# Patient Record
Sex: FEMALE | Race: WHITE | NOT HISPANIC OR LATINO | Employment: FULL TIME | URBAN - METROPOLITAN AREA
[De-identification: names, ages, dates, MRNs, and addresses within clinical notes are randomized per-mention and may not be internally consistent; named-entity substitution may affect disease eponyms.]

---

## 2017-02-25 DIAGNOSIS — E78.5 DYSLIPIDEMIA, GOAL LDL BELOW 130: ICD-10-CM

## 2017-02-25 RX ORDER — PRAVASTATIN SODIUM 20 MG
20 TABLET ORAL DAILY
Qty: 90 TAB | Refills: 2 | Status: SHIPPED | OUTPATIENT
Start: 2017-02-25 | End: 2017-12-07 | Stop reason: SDUPTHER

## 2017-03-09 RX ORDER — HYDROCHLOROTHIAZIDE 12.5 MG/1
TABLET ORAL
Qty: 90 TAB | Refills: 1 | Status: SHIPPED | OUTPATIENT
Start: 2017-03-09 | End: 2017-09-17 | Stop reason: SDUPTHER

## 2017-03-28 RX ORDER — OMEPRAZOLE 20 MG/1
CAPSULE, DELAYED RELEASE ORAL
Qty: 90 CAP | Refills: 0 | Status: SHIPPED | OUTPATIENT
Start: 2017-03-28 | End: 2017-07-11 | Stop reason: SDUPTHER

## 2017-06-19 ENCOUNTER — OFFICE VISIT (OUTPATIENT)
Dept: MEDICAL GROUP | Facility: CLINIC | Age: 57
End: 2017-06-19
Payer: OTHER MISCELLANEOUS

## 2017-06-19 VITALS
BODY MASS INDEX: 35.16 KG/M2 | WEIGHT: 211 LBS | HEART RATE: 66 BPM | TEMPERATURE: 98.2 F | RESPIRATION RATE: 16 BRPM | OXYGEN SATURATION: 93 % | SYSTOLIC BLOOD PRESSURE: 120 MMHG | HEIGHT: 65 IN | DIASTOLIC BLOOD PRESSURE: 70 MMHG

## 2017-06-19 DIAGNOSIS — Z23 NEED FOR TDAP VACCINATION: ICD-10-CM

## 2017-06-19 DIAGNOSIS — R73.01 ELEVATED FASTING GLUCOSE: ICD-10-CM

## 2017-06-19 DIAGNOSIS — Z00.00 ROUTINE GENERAL MEDICAL EXAMINATION AT A HEALTH CARE FACILITY: ICD-10-CM

## 2017-06-19 DIAGNOSIS — Z00.00 LABORATORY EXAMINATION ORDERED AS PART OF A ROUTINE GENERAL MEDICAL EXAMINATION: ICD-10-CM

## 2017-06-19 DIAGNOSIS — Z12.39 SCREENING BREAST EXAMINATION: ICD-10-CM

## 2017-06-19 DIAGNOSIS — E66.9 OBESITY, CLASS II, BMI 35-39.9: ICD-10-CM

## 2017-06-19 DIAGNOSIS — R92.30 DENSE BREAST TISSUE ON MAMMOGRAM: ICD-10-CM

## 2017-06-19 PROBLEM — E66.812 OBESITY, CLASS II, BMI 35-39.9: Status: ACTIVE | Noted: 2017-06-19

## 2017-06-19 PROCEDURE — 90715 TDAP VACCINE 7 YRS/> IM: CPT | Performed by: FAMILY MEDICINE

## 2017-06-19 PROCEDURE — 99396 PREV VISIT EST AGE 40-64: CPT | Mod: 25 | Performed by: FAMILY MEDICINE

## 2017-06-19 PROCEDURE — 90471 IMMUNIZATION ADMIN: CPT | Performed by: FAMILY MEDICINE

## 2017-06-19 ASSESSMENT — ENCOUNTER SYMPTOMS
ORTHOPNEA: 0
SENSORY CHANGE: 0
SHORTNESS OF BREATH: 0
HEADACHES: 0
SORE THROAT: 0
WEIGHT LOSS: 0
MYALGIAS: 0
VOMITING: 0
BLOOD IN STOOL: 0
NERVOUS/ANXIOUS: 0
PALPITATIONS: 0
FEVER: 0
CHILLS: 0
ABDOMINAL PAIN: 0
SPUTUM PRODUCTION: 0
DIARRHEA: 0
NAUSEA: 0
DEPRESSION: 0
HEARTBURN: 0
DIZZINESS: 0
BLURRED VISION: 0
SPEECH CHANGE: 0
DOUBLE VISION: 0
COUGH: 0

## 2017-06-19 ASSESSMENT — PATIENT HEALTH QUESTIONNAIRE - PHQ9: CLINICAL INTERPRETATION OF PHQ2 SCORE: 0

## 2017-06-19 NOTE — MR AVS SNAPSHOT
"        Crista Gomes Coad   2017 9:20 AM   Office Visit   MRN: [id number]    Department:  [facility]   Dept Phone:  [phone]    Description:  Female : 1960   Provider:  Cecilai Marquez M.D.           Reason for Visit     Annual Exam           Allergies as of 2017     Allergen Noted Reactions    Benazepril 2016       Cough      You were diagnosed with     Routine general medical examination at a health care facility   [V70.0.ICD-9-CM]       Laboratory examination ordered as part of a routine general medical examination   [V72.62.ICD-9-CM]       Screening breast examination   [562638]       Dense breast tissue on mammogram   [[id number]]       Elevated fasting glucose   [[id number]]       Need for Tdap vaccination   [[id number]]       Obesity, Class II, BMI 35-39.9 (CMS-HCC)   [852566]         Vital Signs     Blood Pressure Pulse Temperature Respirations Height Weight    120/70 mmHg 66 36.8 °C (98.2 °F) 16 1.651 m (5' 5\") 95.709 kg (211 lb)    Body Mass Index Oxygen Saturation Smoking Status             35.11 kg/m2 93% Former Smoker         Basic Information     Date Of Birth Sex Race Ethnicity Preferred Language    1960 Female White Non- English      Problem List              ICD-10-CM Priority Class Noted - Resolved    GERD (gastroesophageal reflux disease) K21.9   Unknown - Present    Edema of foot R60.0   Unknown - Present    Dyslipidemia, goal LDL below 130 E78.5   Unknown - Present    Vitamin D deficiency disease E55.9   Unknown - Present    Family history of diabetes mellitus Z83.3   Unknown - Present    Family history of premature coronary artery disease Z82.49   Unknown - Present    History of iron deficiency anemia Z86.2   2014 - Present    Erythema nodosum, acute form L52   2016 - Present    Dense breast tissue on mammogram R92.2   2017 - Present    Elevated fasting glucose R73.01   2017 - Present    Obesity, Class II, BMI 35-39.9 (CMS-HCC) E66.9   " 6/19/2017 - Present      Health Maintenance        Date Due Completion Dates    MAMMOGRAM 5/2/2015 5/2/2014 (Prv Comp), 10/25/2012 (Prv Comp), 5/31/2007, 5/31/2007    Override on 5/2/2014: Previously completed    Override on 10/25/2012: Previously completed    COLONOSCOPY 6/24/2026 6/24/2016    IMM DTaP/Tdap/Td Vaccine (2 - Td) 6/19/2027 6/19/2017            Current Immunizations     Tdap Vaccine 6/19/2017      Below and/or attached are the medications your provider expects you to take. Review all of your home medications and newly ordered medications with your provider and/or pharmacist. Follow medication instructions as directed by your provider and/or pharmacist. Please keep your medication list with you and share with your provider. Update the information when medications are discontinued, doses are changed, or new medications (including over-the-counter products) are added; and carry medication information at all times in the event of emergency situations     Allergies:  BENAZEPRIL - (reactions not documented)               Medications  Valid as of: June 19, 2017 -  1:00 PM    Generic Name Brand Name Tablet Size Instructions for use    Cholecalciferol (Cap) Vitamin D3 1000 UNITS Take 1 Cap by mouth every day.        HydroCHLOROthiazide (Tab) HYDRODIURIL 12.5 MG TAKE 1 TABLET DAILY        Irbesartan (Tab) AVAPRO 75 MG Take 1 Tab by mouth every day.        Iron Combinations (Tab) Iron  Take  by mouth.        Omeprazole (CAPSULE DELAYED RELEASE) PRILOSEC 20 MG TAKE 1 CAPSULE DAILY        Pravastatin Sodium (Tab) PRAVACHOL 20 MG Take 1 Tab by mouth every day.        .                 Medicines prescribed today were sent to:     [facility] [address] [place] [address] - [address] [address] [facility] [address]    Phone: [phone] Fax: [phone]    Open 24 Hours?: No    [facility] - [place], [place] - [address] [facility] [address]    [address] [facility] [address]    Phone:  [phone] Fax: [phone]    Open 24 Hours?: No      Medication refill instructions:       If your prescription bottle indicates you have medication refills left, it is not necessary to call your provider’s office. Please contact your pharmacy and they will refill your medication.    If your prescription bottle indicates you do not have any refills left, you may request refills at any time through one of the following ways: The online [employer] system (except Urgent Care), by calling your provider’s office, or by asking your pharmacy to contact your provider’s office with a refill request. Medication refills are processed only during regular business hours and may not be available until the next business day. Your provider may request additional information or to have a follow-up visit with you prior to refilling your medication.   *Please Note: Medication refills are assigned a new Rx number when refilled electronically. Your pharmacy may indicate that no refills were authorized even though a new prescription for the same medication is available at the pharmacy. Please request the medicine by name with the pharmacy before contacting your provider for a refill.        Your To Do List     Future Labs/Procedures Complete By Expires    CBC WITHOUT DIFFERENTIAL  As directed 12/20/2017    COMP METABOLIC PANEL  As directed 6/19/2018    HEMOGLOBIN A1C  As directed 6/20/2018    MA-MAMMO SCREENING BILAT W/GILDA W/CAD  As directed 6/19/2018         [employer] Access Code: Activation code not generated  Current [employer] Status: Active

## 2017-06-19 NOTE — PROGRESS NOTES
Subjective:      Crista Herring is a 57 y.o. female who presents with Annual Exam        She is seen today for her general medical examination.    Annual Exam  Pertinent negatives include no abdominal pain, chest pain, chills, congestion, coughing, fever, headaches, myalgias, nausea, rash, sore throat or vomiting.    has a past medical history of GERD (gastroesophageal reflux disease); Vitamin d deficiency; Edema of foot; Dyslipidemia, goal LDL below 130; Family history of diabetes mellitus; Family history of premature coronary artery disease; Uterus, adenomyosis; Snoring; and Erythema nodosum (5/2016). She also has no past medical history of Dental disorder, Pain, Cold, or Anesthesia.   has past surgical history that includes lymph node excision; tonsillectomy; hysterectomy robotic single site (5/21/2014); cystoscopy (5/21/2014); salpingectomy (5/21/2014); vaginal perineal exam repair (5/21/2014); hysterectomy robotic single site (5/21/2014); cystoscopy (5/21/2014); salpingectomy (5/21/2014); and vaginal perineal exam repair (5/21/2014).  family history includes Diabetes in her father and mother; Diabetes (age of onset: 45) in her brother; GI in her father; Heart Disease in her maternal grandfather, maternal grandmother, and paternal uncle; Heart Disease (age of onset: 60) in her father; Heart Disease (age of onset: 62) in her paternal grandfather; Other in her mother; Stroke (age of onset: 72) in her father.   reports that she quit smoking about 22 years ago. Her smoking use included Cigarettes. She has a .6 pack-year smoking history. She has never used smokeless tobacco. She reports that she does not drink alcohol or use illicit drugs.      Current outpatient prescriptions:   •  omeprazole (PRILOSEC) 20 MG delayed-release capsule, TAKE 1 CAPSULE DAILY, Disp: 90 Cap, Rfl: 0  •  hydrochlorothiazide (HYDRODIURIL) 12.5 MG tablet, TAKE 1 TABLET DAILY, Disp: 90 Tab, Rfl: 1  •  pravastatin (PRAVACHOL) 20 MG Tab,  "Take 1 Tab by mouth every day., Disp: 90 Tab, Rfl: 2  •  irbesartan (AVAPRO) 75 MG tablet, Take 1 Tab by mouth every day., Disp: 90 Tab, Rfl: 2  •  Iron Tab, Take  by mouth., Disp: , Rfl:   •  Cholecalciferol (VITAMIN D3) 1000 UNITS CAPS, Take 1 Cap by mouth every day., Disp: , Rfl:     is allergic to benazepril.      Review of Systems   Constitutional: Negative for fever, chills, weight loss and malaise/fatigue.   HENT: Negative for congestion, hearing loss and sore throat.    Eyes: Negative for blurred vision and double vision.   Respiratory: Negative for cough, sputum production and shortness of breath.    Cardiovascular: Negative for chest pain, palpitations, orthopnea and leg swelling.   Gastrointestinal: Negative for heartburn, nausea, vomiting, abdominal pain, diarrhea and blood in stool.   Genitourinary: Negative for dysuria, urgency and frequency.   Musculoskeletal: Negative for myalgias and joint pain.   Skin: Negative for rash.   Neurological: Negative for dizziness, sensory change, speech change and headaches.   Psychiatric/Behavioral: Negative for depression. The patient is not nervous/anxious.           Objective:     /70 mmHg  Pulse 66  Temp(Src) 36.8 °C (98.2 °F)  Resp 16  Ht 1.651 m (5' 5\")  Wt 95.709 kg (211 lb)  BMI 35.11 kg/m2  SpO2 93%     Physical Exam   Constitutional: She is oriented to person, place, and time. She appears well-developed and well-nourished.   HENT:   Head: Normocephalic and atraumatic.   Mouth/Throat: Oropharynx is clear and moist.   Eyes: EOM are normal. Pupils are equal, round, and reactive to light. Left eye exhibits no discharge.   Neck: Normal range of motion. Neck supple. No JVD present. No thyromegaly present.   Cardiovascular: Normal rate, regular rhythm and normal heart sounds.    No murmur heard.  Pulmonary/Chest: Effort normal and breath sounds normal. No respiratory distress. She has no wheezes. She has no rales.   Abdominal: Soft. Bowel sounds are " normal. She exhibits no distension and no mass. There is no tenderness.   Musculoskeletal: Normal range of motion. She exhibits no edema.   Lymphadenopathy:     She has no cervical adenopathy.   Neurological: She is alert and oriented to person, place, and time. Coordination normal.   Skin: Skin is warm and dry. No rash noted. No erythema.   Psychiatric: She has a normal mood and affect. Her behavior is normal.   Vitals reviewed.        labs from 5/13/17 are reviewed, cholesterol and fasting glucose.  Fasting glucose is 100, just over normal.  Cholesterol risk ratio is 4.3, okay but not ideal, this is on the pravastatin.         Assessment/Plan:     1. Routine general medical examination at a health care facility  She is generally well and making strides towards improved overall health.    2. Laboratory examination ordered as part of a routine general medical examination  Routine orders discussed and placed  - COMP METABOLIC PANEL; Future  - HEMOGLOBIN A1C; Future  - CBC WITHOUT DIFFERENTIAL; Future    3. Screening breast examination  Order discussed and placed  - MA-MAMMO SCREENING BILAT W/GILDA W/CAD; Future    4. Dense breast tissue on mammogram  Order discussed and placed  - MA-MAMMO SCREENING BILAT W/GILDA W/CAD; Future    5. Elevated fasting glucose  Order discussed and placed  - HEMOGLOBIN A1C; Future    6. Need for Tdap vaccination  Administered today  - TDAP VACCINE =>6YO IM    7. Obesity, Class II, BMI 35-39.9 (CMS-HCC)  Advised to continue improved diet and discussed benefit of regular exercise. She has been doing this. Discussed increased regularity and intensity.  - Patient identified as having weight management issue.  Appropriate orders and counseling given.

## 2017-07-08 ENCOUNTER — PATIENT MESSAGE (OUTPATIENT)
Dept: MEDICAL GROUP | Facility: CLINIC | Age: 57
End: 2017-07-08

## 2017-07-11 RX ORDER — OMEPRAZOLE 20 MG/1
CAPSULE, DELAYED RELEASE ORAL
Qty: 90 CAP | Refills: 0 | Status: SHIPPED | OUTPATIENT
Start: 2017-07-11 | End: 2017-10-01 | Stop reason: SDUPTHER

## 2017-09-18 RX ORDER — HYDROCHLOROTHIAZIDE 12.5 MG/1
TABLET ORAL
Qty: 90 TAB | Refills: 1 | Status: SHIPPED | OUTPATIENT
Start: 2017-09-18 | End: 2018-04-01 | Stop reason: SDUPTHER

## 2017-09-18 NOTE — TELEPHONE ENCOUNTER
Was the patient seen in the last year in this department? Yes     Does patient have an active prescription for medications requested? Yes    Received Request Via: Pharmacy

## 2017-10-02 RX ORDER — OMEPRAZOLE 20 MG/1
CAPSULE, DELAYED RELEASE ORAL
Qty: 90 CAP | Refills: 0 | Status: SHIPPED | OUTPATIENT
Start: 2017-10-02 | End: 2018-01-14 | Stop reason: SDUPTHER

## 2017-12-07 DIAGNOSIS — E78.5 DYSLIPIDEMIA, GOAL LDL BELOW 130: ICD-10-CM

## 2017-12-07 RX ORDER — PRAVASTATIN SODIUM 20 MG
TABLET ORAL
Qty: 90 TAB | Refills: 2 | Status: SHIPPED | OUTPATIENT
Start: 2017-12-07 | End: 2018-09-16 | Stop reason: SDUPTHER

## 2018-01-15 RX ORDER — OMEPRAZOLE 20 MG/1
CAPSULE, DELAYED RELEASE ORAL
Qty: 90 CAP | Refills: 2 | Status: SHIPPED | OUTPATIENT
Start: 2018-01-15 | End: 2018-10-21 | Stop reason: SDUPTHER

## 2018-04-02 RX ORDER — HYDROCHLOROTHIAZIDE 12.5 MG/1
TABLET ORAL
Qty: 90 TAB | Refills: 2 | Status: SHIPPED | OUTPATIENT
Start: 2018-04-02 | End: 2018-10-21 | Stop reason: SDUPTHER

## 2018-07-03 DIAGNOSIS — Z83.3 FAMILY HISTORY OF DIABETES MELLITUS: ICD-10-CM

## 2018-07-03 RX ORDER — IRBESARTAN 75 MG/1
75 TABLET ORAL DAILY
Qty: 90 TAB | Refills: 2 | Status: SHIPPED | OUTPATIENT
Start: 2018-07-03 | End: 2018-07-26 | Stop reason: SDUPTHER

## 2018-07-26 DIAGNOSIS — Z83.3 FAMILY HISTORY OF DIABETES MELLITUS: ICD-10-CM

## 2018-07-26 RX ORDER — IRBESARTAN 75 MG/1
75 TABLET ORAL DAILY
Qty: 90 TAB | Refills: 3 | Status: SHIPPED | OUTPATIENT
Start: 2018-07-26 | End: 2018-08-31 | Stop reason: SDUPTHER

## 2018-08-13 ENCOUNTER — OFFICE VISIT (OUTPATIENT)
Dept: MEDICAL GROUP | Facility: MEDICAL CENTER | Age: 58
End: 2018-08-13
Payer: COMMERCIAL

## 2018-08-13 VITALS
TEMPERATURE: 98.7 F | RESPIRATION RATE: 12 BRPM | HEIGHT: 65 IN | OXYGEN SATURATION: 100 % | BODY MASS INDEX: 36.59 KG/M2 | DIASTOLIC BLOOD PRESSURE: 70 MMHG | SYSTOLIC BLOOD PRESSURE: 126 MMHG | WEIGHT: 219.6 LBS | HEART RATE: 78 BPM

## 2018-08-13 DIAGNOSIS — E66.9 OBESITY, CLASS II, BMI 35-39.9: ICD-10-CM

## 2018-08-13 DIAGNOSIS — Z12.39 SCREENING BREAST EXAMINATION: ICD-10-CM

## 2018-08-13 DIAGNOSIS — Z00.00 ROUTINE GENERAL MEDICAL EXAMINATION AT A HEALTH CARE FACILITY: ICD-10-CM

## 2018-08-13 DIAGNOSIS — Z00.00 LABORATORY EXAMINATION ORDERED AS PART OF A ROUTINE GENERAL MEDICAL EXAMINATION: ICD-10-CM

## 2018-08-13 DIAGNOSIS — E55.9 VITAMIN D DEFICIENCY DISEASE: ICD-10-CM

## 2018-08-13 DIAGNOSIS — N95.1 HOT FLASHES DUE TO MENOPAUSE: ICD-10-CM

## 2018-08-13 PROCEDURE — 99396 PREV VISIT EST AGE 40-64: CPT | Performed by: FAMILY MEDICINE

## 2018-08-13 RX ORDER — VENLAFAXINE HYDROCHLORIDE 37.5 MG/1
37.5 CAPSULE, EXTENDED RELEASE ORAL DAILY
Qty: 30 CAP | Refills: 0 | Status: SHIPPED | OUTPATIENT
Start: 2018-08-13 | End: 2018-09-18 | Stop reason: SDUPTHER

## 2018-08-13 ASSESSMENT — ENCOUNTER SYMPTOMS
ABDOMINAL PAIN: 0
COUGH: 0
HEARTBURN: 0
DEPRESSION: 0
FEVER: 0
DOUBLE VISION: 0
NAUSEA: 0
DIZZINESS: 0
PALPITATIONS: 0
WEIGHT LOSS: 0
SPUTUM PRODUCTION: 0
SHORTNESS OF BREATH: 0
HEADACHES: 0
BLOOD IN STOOL: 0
NERVOUS/ANXIOUS: 0
BLURRED VISION: 0
MYALGIAS: 0
VOMITING: 0
ORTHOPNEA: 0
DIARRHEA: 0
CHILLS: 0
SORE THROAT: 0
SPEECH CHANGE: 0
SENSORY CHANGE: 0

## 2018-08-13 ASSESSMENT — PATIENT HEALTH QUESTIONNAIRE - PHQ9: CLINICAL INTERPRETATION OF PHQ2 SCORE: 0

## 2018-08-13 NOTE — PROGRESS NOTES
Subjective:      Crista Herring is a 58 y.o. female who presents with Annual Exam        She is here for her general medical examination.    HPI     has a past medical history of Dyslipidemia, goal LDL below 130; Edema of foot; Erythema nodosum (5/2016); Family history of diabetes mellitus; Family history of premature coronary artery disease; GERD (gastroesophageal reflux disease); Snoring; Uterus, adenomyosis; and Vitamin d deficiency. She also has no past medical history of Anesthesia; Cold; Dental disorder; or Pain.   has a past surgical history that includes lymph node excision; tonsillectomy; hysterectomy robotic single site (5/21/2014); cystoscopy (5/21/2014); salpingectomy (5/21/2014); vaginal perineal exam repair (5/21/2014); hysterectomy robotic single site (5/21/2014); cystoscopy (5/21/2014); salpingectomy (5/21/2014); and vaginal perineal exam repair (5/21/2014).  family history includes Diabetes in her father and mother; Diabetes (age of onset: 45) in her brother; GI in her father; Heart Disease in her maternal grandfather, maternal grandmother, and paternal uncle; Heart Disease (age of onset: 60) in her father; Heart Disease (age of onset: 62) in her paternal grandfather; Other in her mother; Stroke (age of onset: 72) in her father.   reports that she quit smoking about 23 years ago. Her smoking use included Cigarettes. She has a 0.60 pack-year smoking history. She has never used smokeless tobacco. She reports that she does not drink alcohol or use drugs.      Current Outpatient Prescriptions:   •  irbesartan (AVAPRO) 75 MG tablet, Take 1 Tab by mouth every day., Disp: 90 Tab, Rfl: 3  •  hydroCHLOROthiazide (HYDRODIURIL) 12.5 MG tablet, TAKE 1 TABLET DAILY, Disp: 90 Tab, Rfl: 2  •  omeprazole (PRILOSEC) 20 MG delayed-release capsule, TAKE 1 CAPSULE DAILY, Disp: 90 Cap, Rfl: 2  •  pravastatin (PRAVACHOL) 20 MG Tab, TAKE 1 TABLET DAILY, Disp: 90 Tab, Rfl: 2  •  Iron Tab, Take  by mouth., Disp: , Rfl:  "  •  Cholecalciferol (VITAMIN D3) 1000 UNITS CAPS, Take 1 Cap by mouth every day., Disp: , Rfl:   is allergic to benazepril.    Review of Systems   Constitutional: Negative for chills, fever, malaise/fatigue and weight loss.   HENT: Negative for congestion, hearing loss and sore throat.    Eyes: Negative for blurred vision and double vision.   Respiratory: Negative for cough, sputum production and shortness of breath.    Cardiovascular: Negative for chest pain, palpitations, orthopnea and leg swelling.   Gastrointestinal: Negative for abdominal pain, blood in stool, diarrhea, heartburn, nausea and vomiting.   Genitourinary: Negative for dysuria, frequency and urgency.   Musculoskeletal: Negative for joint pain and myalgias.   Skin: Negative for rash.   Neurological: Negative for dizziness, sensory change, speech change and headaches.   Psychiatric/Behavioral: Negative for depression. The patient is not nervous/anxious.           Objective:     /70   Pulse 78   Temp 37.1 °C (98.7 °F)   Ht 1.651 m (5' 5\")   Wt 99.6 kg (219 lb 9.6 oz)   SpO2 100%   Breastfeeding? No   BMI 36.54 kg/m²      Physical Exam   Constitutional: She is oriented to person, place, and time. She appears well-developed and well-nourished.   HENT:   Head: Normocephalic and atraumatic.   Mouth/Throat: Oropharynx is clear and moist.   Eyes: Pupils are equal, round, and reactive to light. EOM are normal. Left eye exhibits no discharge.   Neck: Normal range of motion. Neck supple. No JVD present. No thyromegaly present.   Cardiovascular: Normal rate, regular rhythm and normal heart sounds.    No murmur heard.  Pulmonary/Chest: Effort normal and breath sounds normal. No respiratory distress. She has no wheezes. She has no rales.   Abdominal: Soft. Bowel sounds are normal. She exhibits no distension and no mass. There is no tenderness.   Musculoskeletal: Normal range of motion. She exhibits no edema.   Lymphadenopathy:     She has no cervical " adenopathy.   Neurological: She is alert and oriented to person, place, and time. Coordination normal.   Skin: Skin is warm and dry. No rash noted. No erythema.   Psychiatric: She has a normal mood and affect. Her behavior is normal.   Vitals reviewed.       8/4/18 lab results discussed and reviewed.  Fasting glucose 104, mild cholesterol elevation, high triglycerides at 170, .         Assessment/Plan:     1. Routine general medical examination at a health care facility  She is generally well.  Medical problems are stable.    2. Laboratory examination ordered as part of a routine general medical examination  Routine orders discussed and placed  - COMP METABOLIC PANEL; Future  - CBC WITHOUT DIFFERENTIAL; Future    3. Screening breast examination  Order discussed and placed  - MA-MAMMO SCREENING BILAT W/GILDA W/CAD; Future    4. Vitamin D deficiency disease  Routine follow up order discussed and placed  - VITAMIN D,25 HYDROXY; Future    5. Obesity, Class II, BMI 35-39.9  Discussed   - Patient identified as having weight management issue.  Appropriate orders and counseling given.

## 2018-08-31 DIAGNOSIS — Z83.3 FAMILY HISTORY OF DIABETES MELLITUS: ICD-10-CM

## 2018-08-31 RX ORDER — IRBESARTAN 75 MG/1
75 TABLET ORAL DAILY
Qty: 90 TAB | Refills: 3 | Status: SHIPPED | OUTPATIENT
Start: 2018-08-31 | End: 2019-08-14 | Stop reason: SDUPTHER

## 2018-09-16 DIAGNOSIS — E78.5 DYSLIPIDEMIA, GOAL LDL BELOW 130: ICD-10-CM

## 2018-09-17 RX ORDER — PRAVASTATIN SODIUM 20 MG
TABLET ORAL
Qty: 90 TAB | Refills: 2 | Status: SHIPPED | OUTPATIENT
Start: 2018-09-17 | End: 2019-07-22 | Stop reason: SDUPTHER

## 2018-09-18 DIAGNOSIS — N95.1 HOT FLASHES DUE TO MENOPAUSE: ICD-10-CM

## 2018-09-18 RX ORDER — VENLAFAXINE HYDROCHLORIDE 37.5 MG/1
37.5 CAPSULE, EXTENDED RELEASE ORAL DAILY
Qty: 30 CAP | Refills: 3 | Status: SHIPPED | OUTPATIENT
Start: 2018-09-18 | End: 2018-12-10 | Stop reason: SDUPTHER

## 2018-10-21 RX ORDER — OMEPRAZOLE 20 MG/1
CAPSULE, DELAYED RELEASE ORAL
Qty: 90 CAP | Refills: 2 | Status: SHIPPED | OUTPATIENT
Start: 2018-10-21 | End: 2019-08-06 | Stop reason: SDUPTHER

## 2018-10-21 RX ORDER — HYDROCHLOROTHIAZIDE 12.5 MG/1
TABLET ORAL
Qty: 90 TAB | Refills: 2 | Status: SHIPPED | OUTPATIENT
Start: 2018-10-21 | End: 2019-07-22 | Stop reason: SDUPTHER

## 2018-12-10 DIAGNOSIS — N95.1 HOT FLASHES DUE TO MENOPAUSE: ICD-10-CM

## 2018-12-10 RX ORDER — VENLAFAXINE HYDROCHLORIDE 37.5 MG/1
37.5 CAPSULE, EXTENDED RELEASE ORAL DAILY
Qty: 30 CAP | Refills: 3 | Status: SHIPPED | OUTPATIENT
Start: 2018-12-10 | End: 2018-12-14 | Stop reason: SDUPTHER

## 2018-12-10 NOTE — TELEPHONE ENCOUNTER
Was the patient seen in the last year in this department? Yes    Does patient have an active prescription for medications requested? Yes    Received Request Via: Patient   Pt is requesting a refill on her Venlafaxine

## 2018-12-14 DIAGNOSIS — N95.1 HOT FLASHES DUE TO MENOPAUSE: ICD-10-CM

## 2018-12-14 RX ORDER — VENLAFAXINE HYDROCHLORIDE 37.5 MG/1
37.5 CAPSULE, EXTENDED RELEASE ORAL DAILY
Qty: 90 CAP | Refills: 3 | Status: SHIPPED | OUTPATIENT
Start: 2018-12-14 | End: 2019-11-04

## 2019-07-22 DIAGNOSIS — E78.5 DYSLIPIDEMIA, GOAL LDL BELOW 130: ICD-10-CM

## 2019-07-22 RX ORDER — PRAVASTATIN SODIUM 20 MG
20 TABLET ORAL DAILY
Qty: 90 TAB | Refills: 2 | Status: SHIPPED | OUTPATIENT
Start: 2019-07-22 | End: 2020-05-03

## 2019-07-22 RX ORDER — HYDROCHLOROTHIAZIDE 12.5 MG/1
12.5 TABLET ORAL DAILY
Qty: 90 TAB | Refills: 2 | Status: SHIPPED | OUTPATIENT
Start: 2019-07-22 | End: 2020-05-21

## 2019-07-26 ENCOUNTER — HOSPITAL ENCOUNTER (OUTPATIENT)
Dept: RADIOLOGY | Facility: MEDICAL CENTER | Age: 59
End: 2019-07-26

## 2019-08-02 ENCOUNTER — HOSPITAL ENCOUNTER (OUTPATIENT)
Dept: RADIOLOGY | Facility: MEDICAL CENTER | Age: 59
End: 2019-08-02
Attending: FAMILY MEDICINE
Payer: COMMERCIAL

## 2019-08-02 DIAGNOSIS — Z12.39 SCREENING BREAST EXAMINATION: ICD-10-CM

## 2019-08-02 PROCEDURE — 77063 BREAST TOMOSYNTHESIS BI: CPT

## 2019-08-06 ENCOUNTER — HOSPITAL ENCOUNTER (OUTPATIENT)
Dept: RADIOLOGY | Facility: MEDICAL CENTER | Age: 59
End: 2019-08-06
Attending: FAMILY MEDICINE
Payer: COMMERCIAL

## 2019-08-06 DIAGNOSIS — R92.8 ABNORMAL MAMMOGRAM: ICD-10-CM

## 2019-08-06 PROCEDURE — G0279 TOMOSYNTHESIS, MAMMO: HCPCS | Mod: LT

## 2019-08-06 RX ORDER — OMEPRAZOLE 20 MG/1
CAPSULE, DELAYED RELEASE ORAL
Qty: 90 CAP | Refills: 2 | Status: SHIPPED | OUTPATIENT
Start: 2019-08-06 | End: 2020-05-21

## 2019-08-14 ENCOUNTER — OFFICE VISIT (OUTPATIENT)
Dept: MEDICAL GROUP | Facility: MEDICAL CENTER | Age: 59
End: 2019-08-14
Payer: COMMERCIAL

## 2019-08-14 VITALS
HEIGHT: 65 IN | WEIGHT: 223 LBS | BODY MASS INDEX: 37.15 KG/M2 | OXYGEN SATURATION: 94 % | SYSTOLIC BLOOD PRESSURE: 126 MMHG | RESPIRATION RATE: 14 BRPM | TEMPERATURE: 98.5 F | HEART RATE: 90 BPM | DIASTOLIC BLOOD PRESSURE: 70 MMHG

## 2019-08-14 DIAGNOSIS — Z83.3 FAMILY HISTORY OF DIABETES MELLITUS: ICD-10-CM

## 2019-08-14 DIAGNOSIS — Z00.00 ROUTINE GENERAL MEDICAL EXAMINATION AT A HEALTH CARE FACILITY: ICD-10-CM

## 2019-08-14 PROCEDURE — 99396 PREV VISIT EST AGE 40-64: CPT | Performed by: FAMILY MEDICINE

## 2019-08-14 RX ORDER — IRBESARTAN 75 MG/1
75 TABLET ORAL DAILY
Qty: 90 TAB | Refills: 3 | Status: SHIPPED | OUTPATIENT
Start: 2019-08-14 | End: 2020-09-26

## 2019-08-14 ASSESSMENT — ENCOUNTER SYMPTOMS
DOUBLE VISION: 0
MEMORY LOSS: 0
INSOMNIA: 0
DIZZINESS: 0
CHILLS: 0
HALLUCINATIONS: 0
TREMORS: 0
SPUTUM PRODUCTION: 0
SORE THROAT: 0
DEPRESSION: 0
WEAKNESS: 0
NERVOUS/ANXIOUS: 0
NECK PAIN: 1
FOCAL WEAKNESS: 0
SEIZURES: 0
CLAUDICATION: 0
SENSORY CHANGE: 0
BLURRED VISION: 0
BRUISES/BLEEDS EASILY: 0
WHEEZING: 0
HEADACHES: 0
MYALGIAS: 0
SPEECH CHANGE: 0
ABDOMINAL PAIN: 0
BLOOD IN STOOL: 0
DIAPHORESIS: 0
HEARTBURN: 0
ORTHOPNEA: 0
VOMITING: 0
PALPITATIONS: 0
SHORTNESS OF BREATH: 0
WEIGHT LOSS: 0
LOSS OF CONSCIOUSNESS: 0
FEVER: 0
NAUSEA: 0
HEMOPTYSIS: 0
BACK PAIN: 0
TINGLING: 0
COUGH: 0
DIARRHEA: 0

## 2019-08-14 ASSESSMENT — PATIENT HEALTH QUESTIONNAIRE - PHQ9: CLINICAL INTERPRETATION OF PHQ2 SCORE: 0

## 2019-08-14 ASSESSMENT — LIFESTYLE VARIABLES: SUBSTANCE_ABUSE: 0

## 2019-08-14 NOTE — PROGRESS NOTES
Subjective:      Crista Herring is a 59 y.o. female who presents with Annual Exam        She is here for her general medical examination.    HPI     has a past medical history of Dyslipidemia, goal LDL below 130, Edema of foot, Erythema nodosum (5/2016), Family history of diabetes mellitus, Family history of premature coronary artery disease, GERD (gastroesophageal reflux disease), Snoring, Uterus, adenomyosis, and Vitamin d deficiency. She also has no past medical history of Anesthesia, Cold, Dental disorder, or Pain.   has a past surgical history that includes lymph node excision; tonsillectomy; hysterectomy robotic single site (5/21/2014); cystoscopy (5/21/2014); salpingectomy (5/21/2014); vaginal perineal exam repair (5/21/2014); hysterectomy robotic single site (5/21/2014); cystoscopy (5/21/2014); salpingectomy (5/21/2014); and vaginal perineal exam repair (5/21/2014).  family history includes Diabetes in her father and mother; Diabetes (age of onset: 45) in her brother; GI Disease in her father; Heart Disease in her maternal grandfather, maternal grandmother, and paternal uncle; Heart Disease (age of onset: 60) in her father; Heart Disease (age of onset: 62) in her paternal grandfather; Other in her mother; Stroke (age of onset: 72) in her father.   reports that she quit smoking about 24 years ago. Her smoking use included cigarettes. She has a 0.60 pack-year smoking history. She has never used smokeless tobacco. She reports that she does not drink alcohol or use drugs.      Current Outpatient Medications:   •  omeprazole (PRILOSEC) 20 MG delayed-release capsule, TAKE 1 CAPSULE DAILY, Disp: 90 Cap, Rfl: 2  •  hydroCHLOROthiazide (HYDRODIURIL) 12.5 MG tablet, Take 1 Tab by mouth every day., Disp: 90 Tab, Rfl: 2  •  pravastatin (PRAVACHOL) 20 MG Tab, Take 1 Tab by mouth every day., Disp: 90 Tab, Rfl: 2  •  venlafaxine XR (EFFEXOR XR) 37.5 MG CAPSULE SR 24 HR, Take 1 Cap by mouth every day., Disp: 90 Cap,  "Rfl: 3  •  irbesartan (AVAPRO) 75 MG tablet, Take 1 Tab by mouth every day., Disp: 90 Tab, Rfl: 3  •  Iron Tab, Take  by mouth., Disp: , Rfl:   •  Cholecalciferol (VITAMIN D3) 1000 UNITS CAPS, Take 1 Cap by mouth every day., Disp: , Rfl:   is allergic to benazepril.    Review of Systems   Constitutional: Negative for chills, diaphoresis, fever, malaise/fatigue and weight loss.   HENT: Positive for congestion. Negative for hearing loss and sore throat.         Nasal congestion at night   Eyes: Negative for blurred vision and double vision.   Respiratory: Negative for cough, hemoptysis, sputum production, shortness of breath and wheezing.    Cardiovascular: Negative for chest pain, palpitations, orthopnea, claudication and leg swelling.   Gastrointestinal: Negative for abdominal pain, blood in stool, diarrhea, heartburn, nausea and vomiting.        Heartburn is controlled by her prilosec, severe if does not take it   Genitourinary: Negative for dysuria, frequency, hematuria and urgency.   Musculoskeletal: Positive for neck pain. Negative for back pain, joint pain and myalgias.        Chronic neck and muscle pain   Skin: Negative for rash.   Neurological: Negative for dizziness, tingling, tremors, sensory change, speech change, focal weakness, seizures, loss of consciousness, weakness and headaches.   Endo/Heme/Allergies: Positive for environmental allergies. Does not bruise/bleed easily.   Psychiatric/Behavioral: Negative for depression, hallucinations, memory loss, substance abuse and suicidal ideas. The patient is not nervous/anxious and does not have insomnia.           Objective:     /70   Pulse 90   Temp 36.9 °C (98.5 °F)   Resp 14   Ht 1.651 m (5' 5\")   Wt 101.2 kg (223 lb)   SpO2 94%   BMI 37.11 kg/m²      Physical Exam   Constitutional: She is oriented to person, place, and time. She appears well-developed and well-nourished.   HENT:   Head: Normocephalic and atraumatic.   Mouth/Throat: Oropharynx " is clear and moist.   Eyes: Pupils are equal, round, and reactive to light. EOM are normal. Left eye exhibits no discharge.   Neck: Normal range of motion. Neck supple. No JVD present. No thyromegaly present.   Cardiovascular: Normal rate, regular rhythm and normal heart sounds.   No murmur heard.  Pulmonary/Chest: Effort normal and breath sounds normal. No respiratory distress. She has no wheezes. She has no rales.   Abdominal: Soft. Bowel sounds are normal. She exhibits no distension and no mass. There is no tenderness.   Musculoskeletal: Normal range of motion. She exhibits no edema.   Lymphadenopathy:     She has no cervical adenopathy.   Neurological: She is alert and oriented to person, place, and time. Coordination normal.   Skin: Skin is warm and dry. No rash noted. No erythema.   Psychiatric: She has a normal mood and affect. Her behavior is normal.   Vitals reviewed.       Recent lab results from [facility] reviewed.  Lipids mildly elevated.  Reasonable risk ratio.  Hba1c 5.8%, discussed       Assessment/Plan:     1. Routine general medical examination at a health care facility  She is medically stable and generally well.

## 2019-11-04 DIAGNOSIS — N95.1 HOT FLASHES DUE TO MENOPAUSE: ICD-10-CM

## 2019-11-04 RX ORDER — VENLAFAXINE HYDROCHLORIDE 75 MG/1
75 CAPSULE, EXTENDED RELEASE ORAL DAILY
Qty: 90 CAP | Refills: 3 | Status: SHIPPED | OUTPATIENT
Start: 2019-11-04 | End: 2020-12-08

## 2019-11-05 NOTE — PROGRESS NOTES
Dose increase due to hot flashes (also stress) written to 75 mg and sent to mail order pharmacy.  
Yes

## 2020-05-21 RX ORDER — HYDROCHLOROTHIAZIDE 12.5 MG/1
TABLET ORAL
Qty: 90 TAB | Refills: 3 | Status: SHIPPED | OUTPATIENT
Start: 2020-05-21 | End: 2021-06-16 | Stop reason: SDUPTHER

## 2020-05-21 RX ORDER — OMEPRAZOLE 20 MG/1
CAPSULE, DELAYED RELEASE ORAL
Qty: 90 CAP | Refills: 3 | Status: SHIPPED | OUTPATIENT
Start: 2020-05-21 | End: 2021-06-11 | Stop reason: SDUPTHER

## 2020-09-26 DIAGNOSIS — Z83.3 FAMILY HISTORY OF DIABETES MELLITUS: ICD-10-CM

## 2020-09-26 RX ORDER — IRBESARTAN 75 MG/1
TABLET ORAL
Qty: 90 TAB | Refills: 3 | Status: SHIPPED | OUTPATIENT
Start: 2020-09-26 | End: 2021-10-22

## 2020-12-08 DIAGNOSIS — N95.1 HOT FLASHES DUE TO MENOPAUSE: ICD-10-CM

## 2020-12-08 RX ORDER — VENLAFAXINE HYDROCHLORIDE 75 MG/1
CAPSULE, EXTENDED RELEASE ORAL
Qty: 90 CAP | Refills: 3 | Status: SHIPPED | OUTPATIENT
Start: 2020-12-08 | End: 2022-02-20

## 2020-12-08 NOTE — TELEPHONE ENCOUNTER
Received request via: Pharmacy    Was the patient seen in the last year in this department? No (08/14/2019)    Does the patient have an active prescription (recently filled or refills available) for medication(s) requested? No

## 2021-02-22 DIAGNOSIS — E78.5 DYSLIPIDEMIA, GOAL LDL BELOW 130: ICD-10-CM

## 2021-02-22 RX ORDER — PRAVASTATIN SODIUM 20 MG
TABLET ORAL
Qty: 90 TABLET | Refills: 2 | Status: SHIPPED | OUTPATIENT
Start: 2021-02-22 | End: 2021-12-22

## 2021-02-22 NOTE — TELEPHONE ENCOUNTER
Received request via: Patient    Was the patient seen in the last year in this department? No  (08/14/2019)    Does the patient have an active prescription (recently filled or refills available) for medication(s) requested? Requesting medications be sent to new Mail Order pharmacy    Requested Prescriptions     Pending Prescriptions Disp Refills   • pravastatin (PRAVACHOL) 20 MG Tab 90 tablet 2     Sig: TAKE 1 TABLET DAILY

## 2021-03-04 ENCOUNTER — OFFICE VISIT (OUTPATIENT)
Dept: MEDICAL GROUP | Facility: MEDICAL CENTER | Age: 61
End: 2021-03-04
Payer: COMMERCIAL

## 2021-03-04 VITALS
TEMPERATURE: 99.3 F | HEART RATE: 88 BPM | SYSTOLIC BLOOD PRESSURE: 126 MMHG | HEIGHT: 65 IN | OXYGEN SATURATION: 94 % | BODY MASS INDEX: 38.32 KG/M2 | WEIGHT: 230 LBS | RESPIRATION RATE: 16 BRPM | DIASTOLIC BLOOD PRESSURE: 70 MMHG

## 2021-03-04 DIAGNOSIS — Z00.00 ROUTINE GENERAL MEDICAL EXAMINATION AT A HEALTH CARE FACILITY: ICD-10-CM

## 2021-03-04 DIAGNOSIS — J98.09: ICD-10-CM

## 2021-03-04 DIAGNOSIS — Z00.00 LABORATORY EXAMINATION ORDERED AS PART OF A ROUTINE GENERAL MEDICAL EXAMINATION: ICD-10-CM

## 2021-03-04 DIAGNOSIS — Z12.39 SCREENING BREAST EXAMINATION: ICD-10-CM

## 2021-03-04 PROCEDURE — 99396 PREV VISIT EST AGE 40-64: CPT | Performed by: FAMILY MEDICINE

## 2021-03-04 ASSESSMENT — ENCOUNTER SYMPTOMS
DEPRESSION: 0
INSOMNIA: 0
BRUISES/BLEEDS EASILY: 0
FEVER: 0
SORE THROAT: 0
WHEEZING: 0
ABDOMINAL PAIN: 0
WEIGHT LOSS: 0
MEMORY LOSS: 0
SPUTUM PRODUCTION: 0
SHORTNESS OF BREATH: 0
LOSS OF CONSCIOUSNESS: 0
NECK PAIN: 1
HEADACHES: 0
TREMORS: 0
DIZZINESS: 0
ORTHOPNEA: 0
MYALGIAS: 0
NERVOUS/ANXIOUS: 0
DOUBLE VISION: 0
SPEECH CHANGE: 0
NAUSEA: 0
BACK PAIN: 0
TINGLING: 0
FOCAL WEAKNESS: 0
WEAKNESS: 0
SEIZURES: 0
HALLUCINATIONS: 0
SENSORY CHANGE: 0
DIAPHORESIS: 0
BLOOD IN STOOL: 0
HEARTBURN: 1
BLURRED VISION: 0
VOMITING: 0
DIARRHEA: 0
PALPITATIONS: 0
CHILLS: 0
COUGH: 0

## 2021-03-04 ASSESSMENT — LIFESTYLE VARIABLES: SUBSTANCE_ABUSE: 0

## 2021-03-04 ASSESSMENT — PATIENT HEALTH QUESTIONNAIRE - PHQ9: CLINICAL INTERPRETATION OF PHQ2 SCORE: 0

## 2021-03-04 NOTE — PROGRESS NOTES
Subjective:      Crista Herring is a 60 y.o. female who presents with Annual Exam        she is here for her annual medical examination.  Pap smear not needed, patient is status post hysterectomy for benign reasons.    HPI     has a past medical history of Bronchocele (3/4/2021), Dyslipidemia, goal LDL below 130, Edema of foot, Erythema nodosum (5/2016), Family history of diabetes mellitus, Family history of premature coronary artery disease, GERD (gastroesophageal reflux disease), Snoring, Uterus, adenomyosis, and Vitamin d deficiency.   has a past surgical history that includes lymph node excision; tonsillectomy; hysterectomy robotic single site (5/21/2014); cystoscopy (5/21/2014); salpingectomy (5/21/2014); vaginal perineal exam repair (5/21/2014); hysterectomy robotic single site (5/21/2014); cystoscopy (5/21/2014); salpingectomy (5/21/2014); and vaginal perineal exam repair (5/21/2014).  family history includes Diabetes in her father and mother; Diabetes (age of onset: 45) in her brother; GI Disease in her father; Heart Disease in her maternal grandfather, maternal grandmother, and paternal uncle; Heart Disease (age of onset: 60) in her father; Heart Disease (age of onset: 62) in her paternal grandfather; Other in her mother; Stroke (age of onset: 72) in her father.   reports that she quit smoking about 26 years ago. Her smoking use included cigarettes. She has a 0.60 pack-year smoking history. She has never used smokeless tobacco. She reports that she does not drink alcohol and does not use drugs.      Current Outpatient Medications:   •  pravastatin (PRAVACHOL) 20 MG Tab, TAKE 1 TABLET DAILY, Disp: 90 tablet, Rfl: 2  •  venlafaxine XR (EFFEXOR XR) 75 MG CAPSULE SR 24 HR, TAKE 1 CAPSULE DAILY (DOSE INCREASE), Disp: 90 Cap, Rfl: 3  •  irbesartan (AVAPRO) 75 MG tablet, TAKE 1 TABLET DAILY, Disp: 90 Tab, Rfl: 3  •  omeprazole (PRILOSEC) 20 MG delayed-release capsule, TAKE 1 CAPSULE DAILY, Disp: 90 Cap, Rfl:  "3  •  hydroCHLOROthiazide (HYDRODIURIL) 12.5 MG tablet, TAKE 1 TABLET DAILY, Disp: 90 Tab, Rfl: 3  •  Iron Tab, Take  by mouth., Disp: , Rfl:   •  Cholecalciferol (VITAMIN D3) 1000 UNITS CAPS, Take 1 Cap by mouth every day., Disp: , Rfl:   is allergic to benazepril.    Review of Systems   Constitutional: Negative for chills, diaphoresis, fever, malaise/fatigue and weight loss.        Some menopausal sweats   HENT: Negative for congestion, hearing loss, sore throat and tinnitus.    Eyes: Negative for blurred vision and double vision.   Respiratory: Negative for cough, sputum production, shortness of breath and wheezing.    Cardiovascular: Negative for chest pain, palpitations, orthopnea and leg swelling.   Gastrointestinal: Positive for heartburn. Negative for abdominal pain, blood in stool, diarrhea, nausea and vomiting.        The medication helps her acid reflux   Genitourinary: Negative for dysuria, frequency, hematuria and urgency.   Musculoskeletal: Positive for neck pain. Negative for back pain, joint pain and myalgias.        Chronic moderate neck stifness   Skin: Negative for rash.   Neurological: Negative for dizziness, tingling, tremors, sensory change, speech change, focal weakness, seizures, loss of consciousness, weakness and headaches.   Endo/Heme/Allergies: Negative for environmental allergies. Does not bruise/bleed easily.   Psychiatric/Behavioral: Negative for depression, hallucinations, memory loss, substance abuse and suicidal ideas. The patient is not nervous/anxious and does not have insomnia.         Neck pain or sweating sometimes wakes her          Objective:     /70   Pulse 88   Temp 37.4 °C (99.3 °F)   Resp 16   Ht 1.651 m (5' 5\")   Wt 104 kg (230 lb)   SpO2 94%   BMI 38.27 kg/m²      Physical Exam  Vitals reviewed.   Constitutional:       Appearance: She is well-developed.   HENT:      Head: Normocephalic and atraumatic.   Eyes:      General:         Left eye: No discharge.    "   Pupils: Pupils are equal, round, and reactive to light.   Neck:      Thyroid: No thyromegaly.      Vascular: No JVD.   Cardiovascular:      Rate and Rhythm: Normal rate and regular rhythm.      Heart sounds: Normal heart sounds. No murmur.   Pulmonary:      Effort: Pulmonary effort is normal. No respiratory distress.      Breath sounds: Normal breath sounds. No wheezing or rales.   Abdominal:      General: Bowel sounds are normal. There is no distension.      Palpations: Abdomen is soft. There is no mass.      Tenderness: There is no abdominal tenderness.   Musculoskeletal:         General: Normal range of motion.      Cervical back: Normal range of motion and neck supple.   Lymphadenopathy:      Cervical: No cervical adenopathy.   Skin:     General: Skin is warm and dry.      Findings: No erythema or rash.      Comments: Two active erythema nodosum lesions left calf   Neurological:      Mental Status: She is alert and oriented to person, place, and time.      Coordination: Coordination normal.   Psychiatric:         Behavior: Behavior normal.           CT shows bronchocele in the past, stable.      Obesity discussed    Venlafaxine is helping her hot flashes quite a bit.       Assessment/Plan:        1. Routine general medical examination at a health care facility  She is generally well and medically stable.    2. Laboratory examination ordered as part of a routine general medical examination  Routine orders discussed and placed  - Comp Metabolic Panel; Future  - Lipid Profile; Future  - TSH; Future  - CBC WITH DIFFERENTIAL; Future    3. Screening breast examination  Order discussed and placed  - MA-SCREENING MAMMO BILAT W/TOMOSYNTHESIS W/CAD; Future

## 2021-03-15 DIAGNOSIS — Z23 NEED FOR VACCINATION: ICD-10-CM

## 2021-06-14 RX ORDER — OMEPRAZOLE 20 MG/1
CAPSULE, DELAYED RELEASE ORAL
Qty: 90 CAPSULE | Refills: 3 | Status: SHIPPED | OUTPATIENT
Start: 2021-06-14 | End: 2021-06-14 | Stop reason: SDUPTHER

## 2021-06-14 RX ORDER — OMEPRAZOLE 20 MG/1
CAPSULE, DELAYED RELEASE ORAL
Qty: 90 CAPSULE | Refills: 3 | Status: SHIPPED | OUTPATIENT
Start: 2021-06-14 | End: 2022-08-15

## 2021-06-16 RX ORDER — HYDROCHLOROTHIAZIDE 12.5 MG/1
12.5 TABLET ORAL DAILY
Qty: 90 TABLET | Refills: 2 | Status: SHIPPED | OUTPATIENT
Start: 2021-06-16 | End: 2022-04-11 | Stop reason: SDUPTHER

## 2021-07-19 ENCOUNTER — HOSPITAL ENCOUNTER (OUTPATIENT)
Dept: LAB | Facility: MEDICAL CENTER | Age: 61
End: 2021-07-19
Attending: FAMILY MEDICINE
Payer: COMMERCIAL

## 2021-07-19 DIAGNOSIS — Z00.00 LABORATORY EXAMINATION ORDERED AS PART OF A ROUTINE GENERAL MEDICAL EXAMINATION: ICD-10-CM

## 2021-07-19 LAB
ALBUMIN SERPL BCP-MCNC: 4.1 G/DL (ref 3.2–4.9)
ALBUMIN/GLOB SERPL: 1.5 G/DL
ALP SERPL-CCNC: 70 U/L (ref 30–99)
ALT SERPL-CCNC: 14 U/L (ref 2–50)
ANION GAP SERPL CALC-SCNC: 12 MMOL/L (ref 7–16)
AST SERPL-CCNC: 16 U/L (ref 12–45)
BASOPHILS # BLD AUTO: 1 % (ref 0–1.8)
BASOPHILS # BLD: 0.07 K/UL (ref 0–0.12)
BILIRUB SERPL-MCNC: 0.4 MG/DL (ref 0.1–1.5)
BUN SERPL-MCNC: 13 MG/DL (ref 8–22)
CALCIUM SERPL-MCNC: 9.1 MG/DL (ref 8.5–10.5)
CHLORIDE SERPL-SCNC: 103 MMOL/L (ref 96–112)
CHOLEST SERPL-MCNC: 197 MG/DL (ref 100–199)
CO2 SERPL-SCNC: 27 MMOL/L (ref 20–33)
CREAT SERPL-MCNC: 0.8 MG/DL (ref 0.5–1.4)
EOSINOPHIL # BLD AUTO: 0.12 K/UL (ref 0–0.51)
EOSINOPHIL NFR BLD: 1.7 % (ref 0–6.9)
ERYTHROCYTE [DISTWIDTH] IN BLOOD BY AUTOMATED COUNT: 46.4 FL (ref 35.9–50)
FASTING STATUS PATIENT QL REPORTED: NORMAL
GLOBULIN SER CALC-MCNC: 2.8 G/DL (ref 1.9–3.5)
GLUCOSE SERPL-MCNC: 119 MG/DL (ref 65–99)
HCT VFR BLD AUTO: 44.3 % (ref 37–47)
HDLC SERPL-MCNC: 56 MG/DL
HGB BLD-MCNC: 13.9 G/DL (ref 12–16)
IMM GRANULOCYTES # BLD AUTO: 0.02 K/UL (ref 0–0.11)
IMM GRANULOCYTES NFR BLD AUTO: 0.3 % (ref 0–0.9)
LDLC SERPL CALC-MCNC: 113 MG/DL
LYMPHOCYTES # BLD AUTO: 2.27 K/UL (ref 1–4.8)
LYMPHOCYTES NFR BLD: 32 % (ref 22–41)
MCH RBC QN AUTO: 25.4 PG (ref 27–33)
MCHC RBC AUTO-ENTMCNC: 31.4 G/DL (ref 33.6–35)
MCV RBC AUTO: 81 FL (ref 81.4–97.8)
MONOCYTES # BLD AUTO: 0.56 K/UL (ref 0–0.85)
MONOCYTES NFR BLD AUTO: 7.9 % (ref 0–13.4)
NEUTROPHILS # BLD AUTO: 4.05 K/UL (ref 2–7.15)
NEUTROPHILS NFR BLD: 57.1 % (ref 44–72)
NRBC # BLD AUTO: 0 K/UL
NRBC BLD-RTO: 0 /100 WBC
PLATELET # BLD AUTO: 365 K/UL (ref 164–446)
PMV BLD AUTO: 11.1 FL (ref 9–12.9)
POTASSIUM SERPL-SCNC: 3.8 MMOL/L (ref 3.6–5.5)
PROT SERPL-MCNC: 6.9 G/DL (ref 6–8.2)
RBC # BLD AUTO: 5.47 M/UL (ref 4.2–5.4)
SODIUM SERPL-SCNC: 142 MMOL/L (ref 135–145)
TRIGL SERPL-MCNC: 139 MG/DL (ref 0–149)
TSH SERPL DL<=0.005 MIU/L-ACNC: 2.46 UIU/ML (ref 0.38–5.33)
WBC # BLD AUTO: 7.1 K/UL (ref 4.8–10.8)

## 2021-07-19 PROCEDURE — 84443 ASSAY THYROID STIM HORMONE: CPT

## 2021-07-19 PROCEDURE — 80061 LIPID PANEL: CPT

## 2021-07-19 PROCEDURE — 36415 COLL VENOUS BLD VENIPUNCTURE: CPT

## 2021-07-19 PROCEDURE — 85025 COMPLETE CBC W/AUTO DIFF WBC: CPT

## 2021-07-19 PROCEDURE — 80053 COMPREHEN METABOLIC PANEL: CPT

## 2021-08-10 ENCOUNTER — HOSPITAL ENCOUNTER (OUTPATIENT)
Dept: RADIOLOGY | Facility: MEDICAL CENTER | Age: 61
End: 2021-08-10
Attending: FAMILY MEDICINE
Payer: COMMERCIAL

## 2021-08-10 DIAGNOSIS — Z12.39 SCREENING BREAST EXAMINATION: ICD-10-CM

## 2021-08-10 PROCEDURE — 77063 BREAST TOMOSYNTHESIS BI: CPT

## 2021-10-22 DIAGNOSIS — Z83.3 FAMILY HISTORY OF DIABETES MELLITUS: ICD-10-CM

## 2021-10-22 RX ORDER — IRBESARTAN 75 MG/1
TABLET ORAL
Qty: 90 TABLET | Refills: 3 | Status: SHIPPED | OUTPATIENT
Start: 2021-10-22 | End: 2023-02-07 | Stop reason: SDUPTHER

## 2022-02-20 DIAGNOSIS — N95.1 HOT FLASHES DUE TO MENOPAUSE: ICD-10-CM

## 2022-02-20 RX ORDER — VENLAFAXINE HYDROCHLORIDE 75 MG/1
CAPSULE, EXTENDED RELEASE ORAL
Qty: 90 CAPSULE | Refills: 3 | Status: SHIPPED
Start: 2022-02-20 | End: 2023-02-07

## 2022-04-11 RX ORDER — HYDROCHLOROTHIAZIDE 12.5 MG/1
12.5 TABLET ORAL DAILY
Qty: 90 TABLET | Refills: 2 | Status: SHIPPED | OUTPATIENT
Start: 2022-04-11 | End: 2023-02-27 | Stop reason: SDUPTHER

## 2022-10-23 DIAGNOSIS — E78.5 DYSLIPIDEMIA, GOAL LDL BELOW 130: ICD-10-CM

## 2022-10-24 RX ORDER — PRAVASTATIN SODIUM 20 MG
TABLET ORAL
Qty: 90 TABLET | Refills: 1 | Status: SHIPPED
Start: 2022-10-24 | End: 2023-03-14

## 2023-02-05 SDOH — ECONOMIC STABILITY: HOUSING INSECURITY: IN THE LAST 12 MONTHS, HOW MANY PLACES HAVE YOU LIVED?: 1

## 2023-02-05 SDOH — ECONOMIC STABILITY: INCOME INSECURITY: IN THE LAST 12 MONTHS, WAS THERE A TIME WHEN YOU WERE NOT ABLE TO PAY THE MORTGAGE OR RENT ON TIME?: NO

## 2023-02-05 SDOH — ECONOMIC STABILITY: TRANSPORTATION INSECURITY
IN THE PAST 12 MONTHS, HAS LACK OF RELIABLE TRANSPORTATION KEPT YOU FROM MEDICAL APPOINTMENTS, MEETINGS, WORK OR FROM GETTING THINGS NEEDED FOR DAILY LIVING?: NO

## 2023-02-05 SDOH — ECONOMIC STABILITY: HOUSING INSECURITY
IN THE LAST 12 MONTHS, WAS THERE A TIME WHEN YOU DID NOT HAVE A STEADY PLACE TO SLEEP OR SLEPT IN A SHELTER (INCLUDING NOW)?: NO

## 2023-02-05 SDOH — ECONOMIC STABILITY: FOOD INSECURITY: WITHIN THE PAST 12 MONTHS, YOU WORRIED THAT YOUR FOOD WOULD RUN OUT BEFORE YOU GOT MONEY TO BUY MORE.: NEVER TRUE

## 2023-02-05 SDOH — ECONOMIC STABILITY: INCOME INSECURITY: HOW HARD IS IT FOR YOU TO PAY FOR THE VERY BASICS LIKE FOOD, HOUSING, MEDICAL CARE, AND HEATING?: NOT HARD AT ALL

## 2023-02-05 SDOH — HEALTH STABILITY: PHYSICAL HEALTH: ON AVERAGE, HOW MANY MINUTES DO YOU ENGAGE IN EXERCISE AT THIS LEVEL?: 30 MIN

## 2023-02-05 SDOH — ECONOMIC STABILITY: TRANSPORTATION INSECURITY
IN THE PAST 12 MONTHS, HAS THE LACK OF TRANSPORTATION KEPT YOU FROM MEDICAL APPOINTMENTS OR FROM GETTING MEDICATIONS?: NO

## 2023-02-05 SDOH — ECONOMIC STABILITY: FOOD INSECURITY: WITHIN THE PAST 12 MONTHS, THE FOOD YOU BOUGHT JUST DIDN'T LAST AND YOU DIDN'T HAVE MONEY TO GET MORE.: NEVER TRUE

## 2023-02-05 SDOH — HEALTH STABILITY: MENTAL HEALTH
STRESS IS WHEN SOMEONE FEELS TENSE, NERVOUS, ANXIOUS, OR CAN'T SLEEP AT NIGHT BECAUSE THEIR MIND IS TROUBLED. HOW STRESSED ARE YOU?: NOT AT ALL

## 2023-02-05 SDOH — HEALTH STABILITY: PHYSICAL HEALTH: ON AVERAGE, HOW MANY DAYS PER WEEK DO YOU ENGAGE IN MODERATE TO STRENUOUS EXERCISE (LIKE A BRISK WALK)?: 2 DAYS

## 2023-02-05 SDOH — ECONOMIC STABILITY: TRANSPORTATION INSECURITY
IN THE PAST 12 MONTHS, HAS LACK OF TRANSPORTATION KEPT YOU FROM MEETINGS, WORK, OR FROM GETTING THINGS NEEDED FOR DAILY LIVING?: NO

## 2023-02-05 ASSESSMENT — SOCIAL DETERMINANTS OF HEALTH (SDOH)
HOW MANY DRINKS CONTAINING ALCOHOL DO YOU HAVE ON A TYPICAL DAY WHEN YOU ARE DRINKING: 1 OR 2
IN A TYPICAL WEEK, HOW MANY TIMES DO YOU TALK ON THE PHONE WITH FAMILY, FRIENDS, OR NEIGHBORS?: ONCE A WEEK
HOW OFTEN DO YOU ATTEND CHURCH OR RELIGIOUS SERVICES?: NEVER
HOW OFTEN DO YOU ATTENT MEETINGS OF THE CLUB OR ORGANIZATION YOU BELONG TO?: 1 TO 4 TIMES PER YEAR
DO YOU BELONG TO ANY CLUBS OR ORGANIZATIONS SUCH AS CHURCH GROUPS UNIONS, FRATERNAL OR ATHLETIC GROUPS, OR SCHOOL GROUPS?: YES
HOW HARD IS IT FOR YOU TO PAY FOR THE VERY BASICS LIKE FOOD, HOUSING, MEDICAL CARE, AND HEATING?: NOT HARD AT ALL
HOW OFTEN DO YOU GET TOGETHER WITH FRIENDS OR RELATIVES?: ONCE A WEEK
HOW OFTEN DO YOU GET TOGETHER WITH FRIENDS OR RELATIVES?: ONCE A WEEK
HOW OFTEN DO YOU HAVE A DRINK CONTAINING ALCOHOL: 2-4 TIMES A MONTH
HOW OFTEN DO YOU ATTEND CHURCH OR RELIGIOUS SERVICES?: NEVER
HOW OFTEN DO YOU ATTENT MEETINGS OF THE CLUB OR ORGANIZATION YOU BELONG TO?: 1 TO 4 TIMES PER YEAR
DO YOU BELONG TO ANY CLUBS OR ORGANIZATIONS SUCH AS CHURCH GROUPS UNIONS, FRATERNAL OR ATHLETIC GROUPS, OR SCHOOL GROUPS?: YES
IN A TYPICAL WEEK, HOW MANY TIMES DO YOU TALK ON THE PHONE WITH FAMILY, FRIENDS, OR NEIGHBORS?: ONCE A WEEK
WITHIN THE PAST 12 MONTHS, YOU WORRIED THAT YOUR FOOD WOULD RUN OUT BEFORE YOU GOT THE MONEY TO BUY MORE: NEVER TRUE
HOW OFTEN DO YOU HAVE SIX OR MORE DRINKS ON ONE OCCASION: NEVER

## 2023-02-05 ASSESSMENT — LIFESTYLE VARIABLES
SKIP TO QUESTIONS 9-10: 1
HOW OFTEN DO YOU HAVE SIX OR MORE DRINKS ON ONE OCCASION: NEVER
HOW MANY STANDARD DRINKS CONTAINING ALCOHOL DO YOU HAVE ON A TYPICAL DAY: 1 OR 2
AUDIT-C TOTAL SCORE: 2
HOW OFTEN DO YOU HAVE A DRINK CONTAINING ALCOHOL: 2-4 TIMES A MONTH

## 2023-02-07 ENCOUNTER — OFFICE VISIT (OUTPATIENT)
Dept: MEDICAL GROUP | Facility: MEDICAL CENTER | Age: 63
End: 2023-02-07
Payer: COMMERCIAL

## 2023-02-07 VITALS
RESPIRATION RATE: 14 BRPM | BODY MASS INDEX: 41.05 KG/M2 | TEMPERATURE: 98.4 F | OXYGEN SATURATION: 92 % | WEIGHT: 246.4 LBS | HEART RATE: 87 BPM | SYSTOLIC BLOOD PRESSURE: 134 MMHG | HEIGHT: 65 IN | DIASTOLIC BLOOD PRESSURE: 76 MMHG

## 2023-02-07 DIAGNOSIS — E66.01 MORBID OBESITY WITH BMI OF 40.0-44.9, ADULT (HCC): ICD-10-CM

## 2023-02-07 DIAGNOSIS — Z12.39 SCREENING BREAST EXAMINATION: ICD-10-CM

## 2023-02-07 DIAGNOSIS — N95.1 HOT FLASHES DUE TO MENOPAUSE: ICD-10-CM

## 2023-02-07 DIAGNOSIS — Z00.00 ROUTINE GENERAL MEDICAL EXAMINATION AT A HEALTH CARE FACILITY: ICD-10-CM

## 2023-02-07 DIAGNOSIS — Z00.00 LABORATORY EXAMINATION ORDERED AS PART OF A ROUTINE GENERAL MEDICAL EXAMINATION: ICD-10-CM

## 2023-02-07 DIAGNOSIS — Z83.3 FAMILY HISTORY OF DIABETES MELLITUS: ICD-10-CM

## 2023-02-07 PROBLEM — E66.9 OBESITY, CLASS II, BMI 35-39.9: Status: RESOLVED | Noted: 2017-06-19 | Resolved: 2023-02-07

## 2023-02-07 PROBLEM — E66.812 OBESITY, CLASS II, BMI 35-39.9: Status: RESOLVED | Noted: 2017-06-19 | Resolved: 2023-02-07

## 2023-02-07 PROCEDURE — 99396 PREV VISIT EST AGE 40-64: CPT | Performed by: FAMILY MEDICINE

## 2023-02-07 RX ORDER — IRBESARTAN 75 MG/1
75 TABLET ORAL DAILY
Qty: 90 TABLET | Refills: 3 | Status: SHIPPED | OUTPATIENT
Start: 2023-02-07 | End: 2024-02-09 | Stop reason: SDUPTHER

## 2023-02-07 RX ORDER — VENLAFAXINE HYDROCHLORIDE 37.5 MG/1
37.5 CAPSULE, EXTENDED RELEASE ORAL DAILY
Qty: 90 CAPSULE | Refills: 1 | Status: SHIPPED | OUTPATIENT
Start: 2023-02-07 | End: 2023-11-30

## 2023-02-07 ASSESSMENT — ENCOUNTER SYMPTOMS
SEIZURES: 0
INSOMNIA: 0
ORTHOPNEA: 0
SENSORY CHANGE: 0
FOCAL WEAKNESS: 0
TREMORS: 0
BLOOD IN STOOL: 0
MEMORY LOSS: 0
CHILLS: 0
DEPRESSION: 0
WEAKNESS: 0
BLURRED VISION: 0
NERVOUS/ANXIOUS: 0
DIZZINESS: 0
SPUTUM PRODUCTION: 0
TINGLING: 0
BRUISES/BLEEDS EASILY: 1
DIARRHEA: 0
PALPITATIONS: 0
SHORTNESS OF BREATH: 0
NECK PAIN: 0
HEARTBURN: 0
SORE THROAT: 0
HALLUCINATIONS: 0
NAUSEA: 0
VOMITING: 0
ABDOMINAL PAIN: 0
DIAPHORESIS: 1
WEIGHT LOSS: 0
COUGH: 0
MYALGIAS: 0
LOSS OF CONSCIOUSNESS: 0
WHEEZING: 0
HEADACHES: 0
SPEECH CHANGE: 0
FEVER: 0
DOUBLE VISION: 0
BACK PAIN: 0

## 2023-02-07 ASSESSMENT — PATIENT HEALTH QUESTIONNAIRE - PHQ9: CLINICAL INTERPRETATION OF PHQ2 SCORE: 0

## 2023-02-07 ASSESSMENT — LIFESTYLE VARIABLES: SUBSTANCE_ABUSE: 0

## 2023-02-07 ASSESSMENT — FIBROSIS 4 INDEX: FIB4 SCORE: 0.7

## 2023-02-07 NOTE — PROGRESS NOTES
Subjective     Crista Herring is a 62 y.o. female who presents with Annual Exam        She is here for her annual medical examination.    HPI     has a past medical history of Bronchocele (3/4/2021), Dyslipidemia, goal LDL below 130, Edema of foot, Erythema nodosum (5/2016), Family history of diabetes mellitus, Family history of premature coronary artery disease, GERD (gastroesophageal reflux disease), Morbid obesity with BMI of 40.0-44.9, adult (HCC) (2/7/2023), Snoring, Uterus, adenomyosis, and Vitamin d deficiency.   has a past surgical history that includes lymph node excision; tonsillectomy; hysterectomy robotic single site (5/21/2014); cystoscopy (5/21/2014); salpingectomy (5/21/2014); vaginal perineal exam repair (5/21/2014); hysterectomy robotic single site (5/21/2014); cystoscopy (5/21/2014); salpingectomy (5/21/2014); and vaginal perineal exam repair (5/21/2014).   has a past surgical history that includes lymph node excision; tonsillectomy; hysterectomy robotic single site (5/21/2014); cystoscopy (5/21/2014); salpingectomy (5/21/2014); vaginal perineal exam repair (5/21/2014); hysterectomy robotic single site (5/21/2014); cystoscopy (5/21/2014); salpingectomy (5/21/2014); and vaginal perineal exam repair (5/21/2014).  family history includes Diabetes in her father and mother; Diabetes (age of onset: 45) in her brother; GI Disease in her father; Heart Disease in her maternal grandfather, maternal grandmother, and paternal uncle; Heart Disease (age of onset: 60) in her father; Heart Disease (age of onset: 62) in her paternal grandfather; Other in her mother; Stroke (age of onset: 72) in her father.      Current Outpatient Medications:     pravastatin (PRAVACHOL) 20 MG Tab, TAKE 1 TABLET BY MOUTH DAILY, Disp: 90 Tablet, Rfl: 1    omeprazole (PRILOSEC) 20 MG delayed-release capsule, TAKE 1 CAPSULE BY MOUTH EVERY DAY, Disp: 90 Capsule, Rfl: 0    hydroCHLOROthiazide (HYDRODIURIL) 12.5 MG tablet, Take 1  "Tablet by mouth every day., Disp: 90 Tablet, Rfl: 2    venlafaxine XR (EFFEXOR XR) 75 MG CAPSULE SR 24 HR, TAKE 1 CAPSULE DAILY (DOSE INCREASE), Disp: 90 Capsule, Rfl: 3    irbesartan (AVAPRO) 75 MG tablet, TAKE 1 TABLET DAILY, Disp: 90 Tablet, Rfl: 3  is allergic to benazepril.    Review of Systems   Constitutional:  Positive for diaphoresis. Negative for chills, fever, malaise/fatigue and weight loss.        Still hot flashes and night sweats   HENT:  Negative for congestion, hearing loss, sore throat and tinnitus.    Eyes:  Negative for blurred vision and double vision.        Floaters at times, recent normal optometry visit   Respiratory:  Negative for cough, sputum production, shortness of breath and wheezing.    Cardiovascular:  Negative for chest pain, palpitations, orthopnea and leg swelling.        Rare extra heartbeat or pause   Gastrointestinal:  Negative for abdominal pain, blood in stool, diarrhea, heartburn, nausea and vomiting.        Medicine controls her reflux   Genitourinary:  Negative for dysuria, frequency, hematuria and urgency.   Musculoskeletal:  Negative for back pain, joint pain, myalgias and neck pain.        Baseline, stable   Skin:  Negative for rash.   Neurological:  Negative for dizziness, tingling, tremors, sensory change, speech change, focal weakness, seizures, loss of consciousness, weakness and headaches.   Endo/Heme/Allergies:  Positive for environmental allergies. Bruises/bleeds easily.   Psychiatric/Behavioral:  Negative for depression, hallucinations, memory loss, substance abuse and suicidal ideas. The patient is not nervous/anxious and does not have insomnia.             Objective     /76 (BP Location: Right arm, Patient Position: Sitting, BP Cuff Size: Large adult)   Pulse 87   Temp 36.9 °C (98.4 °F) (Temporal)   Resp 14   Ht 1.651 m (5' 5\")   Wt 112 kg (246 lb 6.4 oz)   SpO2 92%   BMI 41.00 kg/m²      Physical Exam  Vitals reviewed.   Constitutional:       " Appearance: She is well-developed.   HENT:      Head: Normocephalic and atraumatic.   Eyes:      General:         Left eye: No discharge.      Pupils: Pupils are equal, round, and reactive to light.   Neck:      Thyroid: No thyromegaly.      Vascular: No JVD.   Cardiovascular:      Rate and Rhythm: Normal rate and regular rhythm.      Heart sounds: Normal heart sounds. No murmur heard.  Pulmonary:      Effort: Pulmonary effort is normal. No respiratory distress.      Breath sounds: Normal breath sounds. No wheezing or rales.   Abdominal:      General: Bowel sounds are normal. There is no distension.      Palpations: Abdomen is soft. There is no mass.      Tenderness: There is no abdominal tenderness.   Musculoskeletal:         General: Normal range of motion.      Cervical back: Normal range of motion and neck supple.   Lymphadenopathy:      Cervical: No cervical adenopathy.   Skin:     General: Skin is warm and dry.      Findings: No erythema or rash.   Neurological:      Mental Status: She is alert and oriented to person, place, and time.      Coordination: Coordination normal.   Psychiatric:         Mood and Affect: Mood normal.         Behavior: Behavior normal.        Anticipatory guidance she uses a seatbelt whenever she is in a vehicle.  She is going back to the gym.  She is working on eating well.  Colonoscopy due 2026      Assessment & Plan          1. Routine general medical examination at a health care facility  She is medically stable    2. Laboratory examination ordered as part of a routine general medical examination  Routine orders discussed and placed  - HEMOGLOBIN A1C; Future  - Comp Metabolic Panel; Future  - Lipid Profile; Future  - TSH; Future  - CBC WITHOUT DIFFERENTIAL; Future    3. Morbid obesity with BMI of 40.0-44.9, adult (HCC)  Discussed, strategies reviewed.  - Patient identified as having weight management issue.  Appropriate orders and counseling given.    4. Screening breast  examination  Mammogram order discussed and placed  - MA-SCREENING MAMMO BILAT W/TOMOSYNTHESIS W/CAD; Future        Recheck one year, sooner as needed

## 2023-02-24 ENCOUNTER — HOSPITAL ENCOUNTER (OUTPATIENT)
Dept: RADIOLOGY | Facility: MEDICAL CENTER | Age: 63
End: 2023-02-24
Attending: FAMILY MEDICINE
Payer: COMMERCIAL

## 2023-02-24 DIAGNOSIS — Z12.39 SCREENING BREAST EXAMINATION: ICD-10-CM

## 2023-02-24 PROCEDURE — 77063 BREAST TOMOSYNTHESIS BI: CPT

## 2023-02-26 DIAGNOSIS — K21.9 GASTROESOPHAGEAL REFLUX DISEASE, UNSPECIFIED WHETHER ESOPHAGITIS PRESENT: ICD-10-CM

## 2023-02-26 RX ORDER — OMEPRAZOLE 20 MG/1
CAPSULE, DELAYED RELEASE ORAL
Qty: 90 CAPSULE | Refills: 0 | Status: SHIPPED | OUTPATIENT
Start: 2023-02-26 | End: 2023-09-20

## 2023-02-27 RX ORDER — HYDROCHLOROTHIAZIDE 12.5 MG/1
12.5 TABLET ORAL DAILY
Qty: 90 TABLET | Refills: 2 | Status: SHIPPED | OUTPATIENT
Start: 2023-02-27 | End: 2023-02-28 | Stop reason: SDUPTHER

## 2023-02-28 RX ORDER — HYDROCHLOROTHIAZIDE 12.5 MG/1
12.5 TABLET ORAL DAILY
Qty: 90 TABLET | Refills: 1 | Status: SHIPPED | OUTPATIENT
Start: 2023-02-28 | End: 2024-02-09 | Stop reason: SDUPTHER

## 2023-03-03 ENCOUNTER — HOSPITAL ENCOUNTER (OUTPATIENT)
Dept: RADIOLOGY | Facility: MEDICAL CENTER | Age: 63
End: 2023-03-03
Attending: FAMILY MEDICINE
Payer: COMMERCIAL

## 2023-03-03 DIAGNOSIS — R92.8 ABNORMAL MAMMOGRAM: ICD-10-CM

## 2023-03-03 PROCEDURE — G0279 TOMOSYNTHESIS, MAMMO: HCPCS

## 2023-03-03 PROCEDURE — 76642 ULTRASOUND BREAST LIMITED: CPT | Mod: RT

## 2023-03-14 DIAGNOSIS — E78.5 DYSLIPIDEMIA, GOAL LDL BELOW 130: ICD-10-CM

## 2023-03-14 RX ORDER — PRAVASTATIN SODIUM 40 MG
40 TABLET ORAL DAILY
Qty: 90 TABLET | Refills: 3 | Status: SHIPPED | OUTPATIENT
Start: 2023-03-14 | End: 2024-02-09 | Stop reason: SDUPTHER

## 2023-09-17 DIAGNOSIS — K21.9 GASTROESOPHAGEAL REFLUX DISEASE, UNSPECIFIED WHETHER ESOPHAGITIS PRESENT: ICD-10-CM

## 2023-09-20 RX ORDER — OMEPRAZOLE 20 MG/1
CAPSULE, DELAYED RELEASE ORAL
Qty: 90 CAPSULE | Refills: 0 | Status: SHIPPED | OUTPATIENT
Start: 2023-09-20 | End: 2024-02-09 | Stop reason: SDUPTHER

## 2023-11-30 DIAGNOSIS — N95.1 HOT FLASHES DUE TO MENOPAUSE: ICD-10-CM

## 2023-11-30 RX ORDER — VENLAFAXINE HYDROCHLORIDE 37.5 MG/1
CAPSULE, EXTENDED RELEASE ORAL
Qty: 90 CAPSULE | Refills: 1 | Status: SHIPPED | OUTPATIENT
Start: 2023-11-30 | End: 2024-02-09 | Stop reason: SDUPTHER

## 2024-02-06 SDOH — ECONOMIC STABILITY: FOOD INSECURITY: WITHIN THE PAST 12 MONTHS, YOU WORRIED THAT YOUR FOOD WOULD RUN OUT BEFORE YOU GOT MONEY TO BUY MORE.: NEVER TRUE

## 2024-02-06 SDOH — ECONOMIC STABILITY: FOOD INSECURITY: WITHIN THE PAST 12 MONTHS, THE FOOD YOU BOUGHT JUST DIDN'T LAST AND YOU DIDN'T HAVE MONEY TO GET MORE.: NEVER TRUE

## 2024-02-06 SDOH — HEALTH STABILITY: PHYSICAL HEALTH: ON AVERAGE, HOW MANY DAYS PER WEEK DO YOU ENGAGE IN MODERATE TO STRENUOUS EXERCISE (LIKE A BRISK WALK)?: 2 DAYS

## 2024-02-06 SDOH — HEALTH STABILITY: PHYSICAL HEALTH: ON AVERAGE, HOW MANY MINUTES DO YOU ENGAGE IN EXERCISE AT THIS LEVEL?: 20 MIN

## 2024-02-06 SDOH — ECONOMIC STABILITY: HOUSING INSECURITY: IN THE LAST 12 MONTHS, HOW MANY PLACES HAVE YOU LIVED?: 1

## 2024-02-06 SDOH — ECONOMIC STABILITY: INCOME INSECURITY: IN THE LAST 12 MONTHS, WAS THERE A TIME WHEN YOU WERE NOT ABLE TO PAY THE MORTGAGE OR RENT ON TIME?: NO

## 2024-02-06 ASSESSMENT — LIFESTYLE VARIABLES
HOW OFTEN DO YOU HAVE SIX OR MORE DRINKS ON ONE OCCASION: NEVER
AUDIT-C TOTAL SCORE: 2
HOW OFTEN DO YOU HAVE A DRINK CONTAINING ALCOHOL: 2-4 TIMES A MONTH
HOW MANY STANDARD DRINKS CONTAINING ALCOHOL DO YOU HAVE ON A TYPICAL DAY: 1 OR 2
SKIP TO QUESTIONS 9-10: 1

## 2024-02-06 ASSESSMENT — SOCIAL DETERMINANTS OF HEALTH (SDOH)
HOW OFTEN DO YOU ATTEND CHURCH OR RELIGIOUS SERVICES?: NEVER
WITHIN THE PAST 12 MONTHS, YOU WORRIED THAT YOUR FOOD WOULD RUN OUT BEFORE YOU GOT THE MONEY TO BUY MORE: NEVER TRUE
HOW OFTEN DO YOU GET TOGETHER WITH FRIENDS OR RELATIVES?: ONCE A WEEK
HOW MANY DRINKS CONTAINING ALCOHOL DO YOU HAVE ON A TYPICAL DAY WHEN YOU ARE DRINKING: 1 OR 2
HOW OFTEN DO YOU HAVE A DRINK CONTAINING ALCOHOL: 2-4 TIMES A MONTH
IN A TYPICAL WEEK, HOW MANY TIMES DO YOU TALK ON THE PHONE WITH FAMILY, FRIENDS, OR NEIGHBORS?: ONCE A WEEK
HOW OFTEN DO YOU HAVE SIX OR MORE DRINKS ON ONE OCCASION: NEVER
DO YOU BELONG TO ANY CLUBS OR ORGANIZATIONS SUCH AS CHURCH GROUPS UNIONS, FRATERNAL OR ATHLETIC GROUPS, OR SCHOOL GROUPS?: YES
DO YOU BELONG TO ANY CLUBS OR ORGANIZATIONS SUCH AS CHURCH GROUPS UNIONS, FRATERNAL OR ATHLETIC GROUPS, OR SCHOOL GROUPS?: YES
HOW OFTEN DO YOU ATTEND CHURCH OR RELIGIOUS SERVICES?: NEVER
HOW OFTEN DO YOU GET TOGETHER WITH FRIENDS OR RELATIVES?: ONCE A WEEK
HOW OFTEN DO YOU ATTENT MEETINGS OF THE CLUB OR ORGANIZATION YOU BELONG TO?: MORE THAN 4 TIMES PER YEAR
IN A TYPICAL WEEK, HOW MANY TIMES DO YOU TALK ON THE PHONE WITH FAMILY, FRIENDS, OR NEIGHBORS?: ONCE A WEEK
HOW OFTEN DO YOU ATTENT MEETINGS OF THE CLUB OR ORGANIZATION YOU BELONG TO?: MORE THAN 4 TIMES PER YEAR

## 2024-02-09 ENCOUNTER — OFFICE VISIT (OUTPATIENT)
Dept: MEDICAL GROUP | Facility: MEDICAL CENTER | Age: 64
End: 2024-02-09
Payer: COMMERCIAL

## 2024-02-09 VITALS
HEART RATE: 76 BPM | TEMPERATURE: 97.8 F | BODY MASS INDEX: 41.45 KG/M2 | OXYGEN SATURATION: 97 % | HEIGHT: 65 IN | SYSTOLIC BLOOD PRESSURE: 122 MMHG | RESPIRATION RATE: 18 BRPM | WEIGHT: 248.8 LBS | DIASTOLIC BLOOD PRESSURE: 64 MMHG

## 2024-02-09 DIAGNOSIS — E66.01 MORBID OBESITY WITH BMI OF 40.0-44.9, ADULT (HCC): ICD-10-CM

## 2024-02-09 DIAGNOSIS — Z00.00 ROUTINE GENERAL MEDICAL EXAMINATION AT A HEALTH CARE FACILITY: ICD-10-CM

## 2024-02-09 DIAGNOSIS — Z00.00 LABORATORY EXAMINATION ORDERED AS PART OF A ROUTINE GENERAL MEDICAL EXAMINATION: ICD-10-CM

## 2024-02-09 DIAGNOSIS — I10 HYPERTENSION, UNSPECIFIED TYPE: ICD-10-CM

## 2024-02-09 DIAGNOSIS — K21.9 GASTROESOPHAGEAL REFLUX DISEASE, UNSPECIFIED WHETHER ESOPHAGITIS PRESENT: ICD-10-CM

## 2024-02-09 DIAGNOSIS — N95.1 HOT FLASHES DUE TO MENOPAUSE: ICD-10-CM

## 2024-02-09 DIAGNOSIS — E78.5 DYSLIPIDEMIA, GOAL LDL BELOW 130: ICD-10-CM

## 2024-02-09 DIAGNOSIS — Z83.3 FAMILY HISTORY OF DIABETES MELLITUS: ICD-10-CM

## 2024-02-09 DIAGNOSIS — R73.01 ELEVATED FASTING GLUCOSE: ICD-10-CM

## 2024-02-09 PROCEDURE — 3078F DIAST BP <80 MM HG: CPT | Performed by: FAMILY MEDICINE

## 2024-02-09 PROCEDURE — 3074F SYST BP LT 130 MM HG: CPT | Performed by: FAMILY MEDICINE

## 2024-02-09 PROCEDURE — 99396 PREV VISIT EST AGE 40-64: CPT | Performed by: FAMILY MEDICINE

## 2024-02-09 RX ORDER — VENLAFAXINE HYDROCHLORIDE 37.5 MG/1
37.5 CAPSULE, EXTENDED RELEASE ORAL
Qty: 90 CAPSULE | Refills: 3 | Status: SHIPPED | OUTPATIENT
Start: 2024-02-09

## 2024-02-09 RX ORDER — PRAVASTATIN SODIUM 40 MG
40 TABLET ORAL DAILY
Qty: 90 TABLET | Refills: 3 | Status: SHIPPED | OUTPATIENT
Start: 2024-02-09

## 2024-02-09 RX ORDER — IRBESARTAN 75 MG/1
75 TABLET ORAL DAILY
Qty: 90 TABLET | Refills: 3 | Status: SHIPPED | OUTPATIENT
Start: 2024-02-09

## 2024-02-09 RX ORDER — OMEPRAZOLE 20 MG/1
20 CAPSULE, DELAYED RELEASE ORAL
Qty: 90 CAPSULE | Refills: 3 | Status: SHIPPED | OUTPATIENT
Start: 2024-02-09

## 2024-02-09 RX ORDER — HYDROCHLOROTHIAZIDE 12.5 MG/1
12.5 TABLET ORAL DAILY
Qty: 90 TABLET | Refills: 3 | Status: SHIPPED | OUTPATIENT
Start: 2024-02-09

## 2024-02-09 ASSESSMENT — ENCOUNTER SYMPTOMS
SORE THROAT: 0
NECK PAIN: 1
HEMOPTYSIS: 0
DIARRHEA: 0
MYALGIAS: 0
TREMORS: 0
PALPITATIONS: 0
SENSORY CHANGE: 0
FOCAL WEAKNESS: 0
WEIGHT LOSS: 0
DIAPHORESIS: 0
BLURRED VISION: 0
CHILLS: 0
COUGH: 0
DIZZINESS: 0
HEARTBURN: 0
HEADACHES: 0
SPEECH CHANGE: 0
SPUTUM PRODUCTION: 0
NERVOUS/ANXIOUS: 0
FEVER: 0
VOMITING: 0
INSOMNIA: 0
HALLUCINATIONS: 0
ABDOMINAL PAIN: 0
DEPRESSION: 0
WEAKNESS: 0
BACK PAIN: 0
TINGLING: 0
ORTHOPNEA: 0
NAUSEA: 0
WHEEZING: 0
SHORTNESS OF BREATH: 0
BLOOD IN STOOL: 0
MEMORY LOSS: 0
DOUBLE VISION: 0
BRUISES/BLEEDS EASILY: 0

## 2024-02-09 ASSESSMENT — PATIENT HEALTH QUESTIONNAIRE - PHQ9: CLINICAL INTERPRETATION OF PHQ2 SCORE: 0

## 2024-02-09 ASSESSMENT — LIFESTYLE VARIABLES: SUBSTANCE_ABUSE: 0

## 2024-02-09 NOTE — PROGRESS NOTES
Subjective     Crista Herring is a 63 y.o. female who presents with Annual Exam        She is here for her annual examination.    HPI     has a past medical history of Bronchocele (3/4/2021), Dyslipidemia, goal LDL below 130, Edema of foot, Erythema nodosum (5/2016), Family history of diabetes mellitus, Family history of premature coronary artery disease, GERD (gastroesophageal reflux disease), Morbid obesity with BMI of 40.0-44.9, adult (HCC) (2/7/2023), Snoring, Uterus, adenomyosis, and Vitamin d deficiency.   has a past surgical history that includes lymph node excision; tonsillectomy; hysterectomy robotic single site (5/21/2014); cystoscopy (5/21/2014); salpingectomy (5/21/2014); vaginal perineal exam repair (5/21/2014); hysterectomy robotic single site (5/21/2014); cystoscopy (5/21/2014); salpingectomy (5/21/2014); and vaginal perineal exam repair (5/21/2014).  family history includes Diabetes in her father and mother; Diabetes (age of onset: 45) in her brother; GI Disease in her father; Heart Disease in her maternal grandfather, maternal grandmother, and paternal uncle; Heart Disease (age of onset: 60) in her father; Heart Disease (age of onset: 62) in her paternal grandfather; Other in her mother; Stroke (age of onset: 72) in her father.   reports that she quit smoking about 28 years ago. Her smoking use included cigarettes. She started smoking about 31 years ago. She has a 0.6 pack-year smoking history. She has never used smokeless tobacco. She reports that she does not drink alcohol and does not use drugs.      Current Outpatient Medications:     venlafaxine XR (EFFEXOR XR) 37.5 MG CAPSULE SR 24 HR, TAKE 1 CAPSULE BY MOUTH EVERY DAY. DOSE DECREASE, Disp: 90 Capsule, Rfl: 1    omeprazole (PRILOSEC) 20 MG delayed-release capsule, TAKE 1 CAPSULE BY MOUTH EVERY DAY, Disp: 90 Capsule, Rfl: 0    pravastatin (PRAVACHOL) 40 MG tablet, Take 1 Tablet by mouth every day., Disp: 90 Tablet, Rfl: 3     "hydroCHLOROthiazide (HYDRODIURIL) 12.5 MG tablet, Take 1 Tablet by mouth every day., Disp: 90 Tablet, Rfl: 1    irbesartan (AVAPRO) 75 MG tablet, Take 1 Tablet by mouth every day., Disp: 90 Tablet, Rfl: 3  is allergic to benazepril.    Review of Systems   Constitutional:  Negative for chills, diaphoresis, fever, malaise/fatigue and weight loss.   HENT:  Negative for congestion, hearing loss, sore throat and tinnitus.    Eyes:  Negative for blurred vision and double vision.   Respiratory:  Negative for cough, hemoptysis, sputum production, shortness of breath and wheezing.         Snores but no daytime somnolence   Cardiovascular:  Negative for chest pain, palpitations, orthopnea and leg swelling.   Gastrointestinal:  Negative for abdominal pain, blood in stool, diarrhea, heartburn, nausea and vomiting.   Genitourinary:  Negative for dysuria, frequency, hematuria and urgency.   Musculoskeletal:  Positive for neck pain. Negative for back pain, joint pain and myalgias.        Some chronic neck pain and stiffness   Skin:  Negative for rash.        Erythema nodosum does flare at times   Neurological:  Negative for dizziness, tingling, tremors, sensory change, speech change, focal weakness, weakness and headaches.   Endo/Heme/Allergies:  Positive for environmental allergies. Does not bruise/bleed easily.   Psychiatric/Behavioral:  Negative for depression, hallucinations, memory loss, substance abuse and suicidal ideas. The patient is not nervous/anxious and does not have insomnia.           Objective     /64   Pulse 76   Temp 36.6 °C (97.8 °F)   Resp 18   Ht 1.651 m (5' 5\")   Wt 113 kg (248 lb 12.8 oz)   SpO2 97%   BMI 41.40 kg/m²      Physical Exam  Vitals reviewed.   Constitutional:       Appearance: She is well-developed.   HENT:      Head: Normocephalic and atraumatic.   Eyes:      General:         Left eye: No discharge.      Pupils: Pupils are equal, round, and reactive to light.   Neck:      Thyroid: " No thyromegaly.      Vascular: No JVD.   Cardiovascular:      Rate and Rhythm: Normal rate and regular rhythm.      Heart sounds: Normal heart sounds. No murmur heard.  Pulmonary:      Effort: Pulmonary effort is normal. No respiratory distress.      Breath sounds: Normal breath sounds. No wheezing or rales.   Abdominal:      General: Bowel sounds are normal. There is no distension.      Palpations: Abdomen is soft. There is no mass.      Tenderness: There is no abdominal tenderness.   Musculoskeletal:         General: Normal range of motion.      Cervical back: Normal range of motion and neck supple.   Lymphadenopathy:      Cervical: No cervical adenopathy.   Skin:     General: Skin is warm and dry.      Findings: No erythema or rash.   Neurological:      Mental Status: She is alert and oriented to person, place, and time.      Coordination: Coordination normal.   Psychiatric:         Mood and Affect: Mood normal.         Behavior: Behavior normal.               Anticipatory guidance:  seatbelt worn.  Yearly preventive examinations recommended.  Feels safe at home.  Immunizations discussed.  Colonoscopy due 2026.        Assessment & Plan        1. Routine general medical examination at a health care facility  She is generally well.    2. Laboratory examination ordered as part of a routine general medical examination  Routine orders discussed and placed.  - Comp Metabolic Panel; Future  - CBC WITHOUT DIFFERENTIAL; Future  - TSH; Future  - Lipid Profile; Future    3. Morbid obesity with BMI of 40.0-44.9, adult (HCC)  Discussed, having trouble making changes.      4. Elevated fasting glucose  Lab orders discussed and placed.  - Comp Metabolic Panel; Future  - HEMOGLOBIN A1C; Future     Recheck one year, sooner as needed

## 2024-03-19 LAB
ALBUMIN SERPL-MCNC: 4.1 G/DL (ref 3.9–4.9)
ALBUMIN/GLOB SERPL: 1.8 {RATIO} (ref 1.2–2.2)
ALP SERPL-CCNC: 82 IU/L (ref 44–121)
ALT SERPL-CCNC: 17 IU/L (ref 0–32)
AST SERPL-CCNC: 11 IU/L (ref 0–40)
BILIRUB SERPL-MCNC: 0.4 MG/DL (ref 0–1.2)
BUN SERPL-MCNC: 15 MG/DL (ref 8–27)
BUN/CREAT SERPL: 18 (ref 12–28)
CALCIUM SERPL-MCNC: 9.5 MG/DL (ref 8.7–10.3)
CHLORIDE SERPL-SCNC: 102 MMOL/L (ref 96–106)
CHOLEST SERPL-MCNC: 223 MG/DL (ref 100–199)
CO2 SERPL-SCNC: 23 MMOL/L (ref 20–29)
CREAT SERPL-MCNC: 0.84 MG/DL (ref 0.57–1)
EGFRCR SERPLBLD CKD-EPI 2021: 79 ML/MIN/1.73
ERYTHROCYTE [DISTWIDTH] IN BLOOD BY AUTOMATED COUNT: 14.9 % (ref 11.7–15.4)
GLOBULIN SER CALC-MCNC: 2.3 G/DL (ref 1.5–4.5)
GLUCOSE SERPL-MCNC: 109 MG/DL (ref 70–99)
HBA1C MFR BLD: 6.4 % (ref 4.8–5.6)
HCT VFR BLD AUTO: 48 % (ref 34–46.6)
HDLC SERPL-MCNC: 52 MG/DL
HGB BLD-MCNC: 14.7 G/DL (ref 11.1–15.9)
LABORATORY COMMENT REPORT: ABNORMAL
LDLC SERPL CALC-MCNC: 142 MG/DL (ref 0–99)
MCH RBC QN AUTO: 26.3 PG (ref 26.6–33)
MCHC RBC AUTO-ENTMCNC: 30.6 G/DL (ref 31.5–35.7)
MCV RBC AUTO: 86 FL (ref 79–97)
NRBC BLD AUTO-RTO: ABNORMAL %
PLATELET # BLD AUTO: 363 X10E3/UL (ref 150–450)
POTASSIUM SERPL-SCNC: 4.2 MMOL/L (ref 3.5–5.2)
PROT SERPL-MCNC: 6.4 G/DL (ref 6–8.5)
RBC # BLD AUTO: 5.6 X10E6/UL (ref 3.77–5.28)
SODIUM SERPL-SCNC: 139 MMOL/L (ref 134–144)
TRIGL SERPL-MCNC: 162 MG/DL (ref 0–149)
TSH SERPL DL<=0.005 MIU/L-ACNC: 2.74 UIU/ML (ref 0.45–4.5)
VLDLC SERPL CALC-MCNC: 29 MG/DL (ref 5–40)
WBC # BLD AUTO: 8.2 X10E3/UL (ref 3.4–10.8)

## 2025-04-03 DIAGNOSIS — I10 HYPERTENSION, UNSPECIFIED TYPE: ICD-10-CM

## 2025-04-03 DIAGNOSIS — Z83.3 FAMILY HISTORY OF DIABETES MELLITUS: ICD-10-CM

## 2025-04-03 DIAGNOSIS — E78.5 DYSLIPIDEMIA, GOAL LDL BELOW 130: ICD-10-CM

## 2025-04-04 RX ORDER — PRAVASTATIN SODIUM 40 MG
40 TABLET ORAL DAILY
Qty: 90 TABLET | Refills: 0 | Status: SHIPPED | OUTPATIENT
Start: 2025-04-04

## 2025-04-04 RX ORDER — IRBESARTAN 75 MG/1
75 TABLET ORAL DAILY
Qty: 90 TABLET | Refills: 0 | Status: SHIPPED | OUTPATIENT
Start: 2025-04-04

## 2025-04-04 RX ORDER — HYDROCHLOROTHIAZIDE 12.5 MG/1
12.5 TABLET ORAL DAILY
Qty: 90 TABLET | Refills: 0 | Status: SHIPPED | OUTPATIENT
Start: 2025-04-04

## 2025-06-04 SDOH — HEALTH STABILITY: PHYSICAL HEALTH

## 2025-06-04 SDOH — ECONOMIC STABILITY: TRANSPORTATION INSECURITY

## 2025-06-04 SDOH — HEALTH STABILITY: MENTAL HEALTH

## 2025-06-04 SDOH — ECONOMIC STABILITY: HOUSING INSECURITY

## 2025-06-05 ENCOUNTER — APPOINTMENT (OUTPATIENT)
Dept: MEDICAL GROUP | Facility: MEDICAL CENTER | Age: 65
End: 2025-06-05
Payer: COMMERCIAL

## 2025-06-05 VITALS
OXYGEN SATURATION: 95 % | WEIGHT: 222.8 LBS | SYSTOLIC BLOOD PRESSURE: 142 MMHG | HEART RATE: 62 BPM | DIASTOLIC BLOOD PRESSURE: 80 MMHG | HEIGHT: 65 IN | RESPIRATION RATE: 18 BRPM | BODY MASS INDEX: 37.12 KG/M2 | TEMPERATURE: 97.4 F

## 2025-06-05 DIAGNOSIS — R92.343 EXTREMELY DENSE TISSUE OF BOTH BREASTS ON MAMMOGRAPHY: ICD-10-CM

## 2025-06-05 DIAGNOSIS — I10 HYPERTENSION, UNSPECIFIED TYPE: ICD-10-CM

## 2025-06-05 DIAGNOSIS — Z00.00 LABORATORY EXAMINATION ORDERED AS PART OF A ROUTINE GENERAL MEDICAL EXAMINATION: ICD-10-CM

## 2025-06-05 DIAGNOSIS — Z83.3 FAMILY HISTORY OF DIABETES MELLITUS: ICD-10-CM

## 2025-06-05 DIAGNOSIS — Z12.39 SCREENING BREAST EXAMINATION: ICD-10-CM

## 2025-06-05 DIAGNOSIS — Z00.00 ROUTINE GENERAL MEDICAL EXAMINATION AT A HEALTH CARE FACILITY: ICD-10-CM

## 2025-06-05 DIAGNOSIS — K21.9 GASTROESOPHAGEAL REFLUX DISEASE, UNSPECIFIED WHETHER ESOPHAGITIS PRESENT: ICD-10-CM

## 2025-06-05 DIAGNOSIS — E66.812 OBESITY, CLASS II, BMI 35-39.9: ICD-10-CM

## 2025-06-05 DIAGNOSIS — E78.5 DYSLIPIDEMIA, GOAL LDL BELOW 130: ICD-10-CM

## 2025-06-05 PROBLEM — E66.01 MORBID OBESITY WITH BMI OF 40.0-44.9, ADULT (HCC): Status: RESOLVED | Noted: 2023-02-07 | Resolved: 2025-06-05

## 2025-06-05 PROCEDURE — 1125F AMNT PAIN NOTED PAIN PRSNT: CPT | Performed by: FAMILY MEDICINE

## 2025-06-05 PROCEDURE — 99396 PREV VISIT EST AGE 40-64: CPT | Performed by: FAMILY MEDICINE

## 2025-06-05 PROCEDURE — 3077F SYST BP >= 140 MM HG: CPT | Performed by: FAMILY MEDICINE

## 2025-06-05 PROCEDURE — 3079F DIAST BP 80-89 MM HG: CPT | Performed by: FAMILY MEDICINE

## 2025-06-05 RX ORDER — PRAVASTATIN SODIUM 40 MG
40 TABLET ORAL DAILY
Qty: 90 TABLET | Refills: 3 | Status: SHIPPED | OUTPATIENT
Start: 2025-06-05

## 2025-06-05 RX ORDER — ASCORBIC ACID 500 MG
500 TABLET ORAL DAILY
COMMUNITY

## 2025-06-05 RX ORDER — OMEPRAZOLE 20 MG/1
20 CAPSULE, DELAYED RELEASE ORAL
Qty: 90 CAPSULE | Refills: 3 | Status: SHIPPED | OUTPATIENT
Start: 2025-06-05

## 2025-06-05 RX ORDER — HYDROCHLOROTHIAZIDE 12.5 MG/1
12.5 TABLET ORAL DAILY
Qty: 90 TABLET | Refills: 3 | Status: SHIPPED | OUTPATIENT
Start: 2025-06-05

## 2025-06-05 RX ORDER — IRBESARTAN 75 MG/1
75 TABLET ORAL DAILY
Qty: 90 TABLET | Refills: 3 | Status: SHIPPED | OUTPATIENT
Start: 2025-06-05

## 2025-06-05 ASSESSMENT — ENCOUNTER SYMPTOMS
BACK PAIN: 0
SHORTNESS OF BREATH: 0
INSOMNIA: 0
DIARRHEA: 0
BLURRED VISION: 0
DIZZINESS: 0
DOUBLE VISION: 0
FOCAL WEAKNESS: 0
BLOOD IN STOOL: 0
HEARTBURN: 1
TREMORS: 0
SPEECH CHANGE: 0
SPUTUM PRODUCTION: 0
NECK PAIN: 1
HEADACHES: 0
NAUSEA: 0
ORTHOPNEA: 0
SORE THROAT: 0
TINGLING: 0
BRUISES/BLEEDS EASILY: 0
VOMITING: 0
MEMORY LOSS: 0
DEPRESSION: 0
SENSORY CHANGE: 0
CHILLS: 0
HALLUCINATIONS: 0
FEVER: 0
MYALGIAS: 0
NERVOUS/ANXIOUS: 0
COUGH: 0
WEIGHT LOSS: 0
WEAKNESS: 0
ABDOMINAL PAIN: 0
PALPITATIONS: 0

## 2025-06-05 ASSESSMENT — PAIN SCALES - GENERAL: PAINLEVEL_OUTOF10: 7=MODERATE-SEVERE PAIN

## 2025-06-05 ASSESSMENT — FIBROSIS 4 INDEX: FIB4 SCORE: 0.46

## 2025-06-05 ASSESSMENT — LIFESTYLE VARIABLES: SUBSTANCE_ABUSE: 0

## 2025-06-05 ASSESSMENT — PATIENT HEALTH QUESTIONNAIRE - PHQ9: CLINICAL INTERPRETATION OF PHQ2 SCORE: 0

## 2025-06-05 NOTE — PROGRESS NOTES
Subjective     Crista Herring is a 65 y.o. female who presents with Annual Exam       She is here for her annual examination.     HPI     has a past medical history of Bronchocele (3/4/2021), Dyslipidemia, goal LDL below 130, Edema of foot, Erythema nodosum (5/2016), Family history of diabetes mellitus, Family history of premature coronary artery disease, GERD (gastroesophageal reflux disease), Morbid obesity with BMI of 40.0-44.9, adult (HCC) (2/7/2023), Snoring, Uterus, adenomyosis, and Vitamin d deficiency.   has a past surgical history that includes lymph node excision; tonsillectomy; hysterectomy robotic single site (5/21/2014); cystoscopy (5/21/2014); salpingectomy (5/21/2014); vaginal perineal exam repair (5/21/2014); hysterectomy robotic single site (5/21/2014); cystoscopy (5/21/2014); salpingectomy (5/21/2014); and vaginal perineal exam repair (5/21/2014).  family history includes Diabetes in her father and mother; Diabetes (age of onset: 45) in her brother; GI Disease in her father; Heart Disease in her maternal grandfather, maternal grandmother, and paternal uncle; Heart Disease (age of onset: 60) in her father; Heart Disease (age of onset: 62) in her paternal grandfather; Other in her mother; Stroke (age of onset: 72) in her father.   reports that she quit smoking about 30 years ago. Her smoking use included cigarettes. She started smoking about 33 years ago. She has a 0.6 pack-year smoking history. She has never used smokeless tobacco. She reports that she does not drink alcohol and does not use drugs.    Current Medications[1]  is allergic to benazepril.    Review of Systems   Constitutional:  Negative for chills, fever, malaise/fatigue and weight loss.   HENT:  Negative for congestion, hearing loss and sore throat.    Eyes:  Negative for blurred vision and double vision.   Respiratory:  Negative for cough, sputum production and shortness of breath.    Cardiovascular:  Negative for chest pain,  "palpitations, orthopnea and leg swelling.   Gastrointestinal:  Positive for heartburn. Negative for abdominal pain, blood in stool, diarrhea, nausea and vomiting.        Heartburn controlled by omeprazole   Genitourinary:  Negative for dysuria, frequency, hematuria and urgency.   Musculoskeletal:  Positive for neck pain. Negative for back pain, joint pain and myalgias.        Chronic stable neck pain   Skin:  Negative for rash.   Neurological:  Negative for dizziness, tingling, tremors, sensory change, speech change, focal weakness, weakness and headaches.   Endo/Heme/Allergies:  Negative for environmental allergies. Does not bruise/bleed easily.   Psychiatric/Behavioral:  Negative for depression, hallucinations, memory loss, substance abuse and suicidal ideas. The patient is not nervous/anxious and does not have insomnia.           Objective     BP (!) 142/80 (BP Location: Right arm, Patient Position: Sitting, BP Cuff Size: Large adult)   Pulse 62   Temp 36.3 °C (97.4 °F) (Temporal)   Resp 18   Ht 1.651 m (5' 5\")   Wt 101 kg (222 lb 12.8 oz)   SpO2 95%   BMI 37.08 kg/m²      Physical Exam                           Anticipatory guidance:  seatbelt worn.  Immunizations reviewed.  Colonoscopy due 6/2026.     Assessment & Plan  Routine general medical examination at a health care facility  She is generally well       Laboratory examination ordered as part of a routine general medical examination  Routine orders discussed and placed.  Orders:    Comp Metabolic Panel; Future    CBC WITHOUT DIFFERENTIAL; Future    TSH; Future    Lipid Profile; Future    HEMOGLOBIN A1C; Future    Obesity, Class II, BMI 35-39.9  She has had good voluntary weight loss, congratulated, made major dietary changes  Orders:    Patient identified as having weight management issue.  Appropriate orders and counseling given.    Screening breast examination  Mammogram order discussed and placed  Orders:    MA-SCREENING MAMMO BILAT " W/TOMOSYNTHESIS W/CAD; Future    Extremely dense tissue of both breasts on mammography  US order discussed and placed  Orders:    US-BREAST BILAT-LIMITED; Future       Recheck one year, sooner as needed               [1]   Current Outpatient Medications:     MAGNESIUM GLYCINATE PO, Take 500 mg by mouth every day., Disp: , Rfl:     TURMERIC PO, Take 2,400 mg by mouth every day. With Ginger, Disp: , Rfl:     Ergocalciferol 50 MCG (2000 UT) Cap, Take 2,000 Int'l Units by mouth every day., Disp: , Rfl:     Ferrous Sulfate (IRON PO), Take 65 mg by mouth every day.  mg Ferrous Slufate, Disp: , Rfl:     ascorbic acid (ASCORBIC ACID) 500 MG Tab, Take 500 mg by mouth every day., Disp: , Rfl:     hydroCHLOROthiazide 12.5 MG tablet, TAKE 1 TABLET BY MOUTH EVERY DAY., Disp: 90 Tablet, Rfl: 0    pravastatin (PRAVACHOL) 40 MG tablet, TAKE 1 TABLET BY MOUTH EVERY DAY., Disp: 90 Tablet, Rfl: 0    irbesartan (AVAPRO) 75 MG tablet, TAKE 1 TABLET BY MOUTH EVERY DAY., Disp: 90 Tablet, Rfl: 0    omeprazole (PRILOSEC) 20 MG delayed-release capsule, Take 1 Capsule by mouth every day., Disp: 90 Capsule, Rfl: 3

## 2025-07-10 DIAGNOSIS — R92.333 HETEROGENEOUSLY DENSE TISSUE OF BOTH BREASTS ON MAMMOGRAPHY: Primary | ICD-10-CM

## 2025-07-12 LAB
ALBUMIN SERPL-MCNC: 4.2 G/DL (ref 3.9–4.9)
ALP SERPL-CCNC: 61 IU/L (ref 44–121)
ALT SERPL-CCNC: 20 IU/L (ref 0–32)
AST SERPL-CCNC: 14 IU/L (ref 0–40)
BILIRUB SERPL-MCNC: 0.4 MG/DL (ref 0–1.2)
BUN SERPL-MCNC: 13 MG/DL (ref 8–27)
BUN/CREAT SERPL: 17 (ref 12–28)
CALCIUM SERPL-MCNC: 9.7 MG/DL (ref 8.7–10.3)
CHLORIDE SERPL-SCNC: 102 MMOL/L (ref 96–106)
CHOLEST SERPL-MCNC: 245 MG/DL (ref 100–199)
CO2 SERPL-SCNC: 22 MMOL/L (ref 20–29)
CREAT SERPL-MCNC: 0.75 MG/DL (ref 0.57–1)
EGFRCR SERPLBLD CKD-EPI 2021: 89 ML/MIN/1.73
ERYTHROCYTE [DISTWIDTH] IN BLOOD BY AUTOMATED COUNT: 14.9 % (ref 11.7–15.4)
GLOBULIN SER CALC-MCNC: 2.2 G/DL (ref 1.5–4.5)
GLUCOSE SERPL-MCNC: 94 MG/DL (ref 70–99)
HBA1C MFR BLD: 5.8 % (ref 4.8–5.6)
HCT VFR BLD AUTO: 47.8 % (ref 34–46.6)
HDLC SERPL-MCNC: 50 MG/DL
HGB BLD-MCNC: 15.1 G/DL (ref 11.1–15.9)
LDL CALC COMMENT:: ABNORMAL
LDLC SERPL CALC-MCNC: 169 MG/DL (ref 0–99)
MCH RBC QN AUTO: 27.7 PG (ref 26.6–33)
MCHC RBC AUTO-ENTMCNC: 31.6 G/DL (ref 31.5–35.7)
MCV RBC AUTO: 88 FL (ref 79–97)
NRBC BLD AUTO-RTO: ABNORMAL %
PLATELET # BLD AUTO: 296 X10E3/UL (ref 150–450)
POTASSIUM SERPL-SCNC: 4.2 MMOL/L (ref 3.5–5.2)
PROT SERPL-MCNC: 6.4 G/DL (ref 6–8.5)
RBC # BLD AUTO: 5.45 X10E6/UL (ref 3.77–5.28)
SODIUM SERPL-SCNC: 142 MMOL/L (ref 134–144)
TRIGL SERPL-MCNC: 142 MG/DL (ref 0–149)
TSH SERPL DL<=0.005 MIU/L-ACNC: 2.3 UIU/ML (ref 0.45–4.5)
VLDLC SERPL CALC-MCNC: 26 MG/DL (ref 5–40)
WBC # BLD AUTO: 7.1 X10E3/UL (ref 3.4–10.8)